# Patient Record
Sex: MALE | Race: WHITE | NOT HISPANIC OR LATINO
[De-identification: names, ages, dates, MRNs, and addresses within clinical notes are randomized per-mention and may not be internally consistent; named-entity substitution may affect disease eponyms.]

---

## 2018-02-22 ENCOUNTER — APPOINTMENT (OUTPATIENT)
Dept: HEMATOLOGY ONCOLOGY | Facility: CLINIC | Age: 73
End: 2018-02-22
Payer: MEDICARE

## 2018-02-22 VITALS
BODY MASS INDEX: 30.4 KG/M2 | RESPIRATION RATE: 20 BRPM | TEMPERATURE: 97.8 F | OXYGEN SATURATION: 98 % | SYSTOLIC BLOOD PRESSURE: 173 MMHG | DIASTOLIC BLOOD PRESSURE: 84 MMHG | WEIGHT: 209.99 LBS | HEART RATE: 61 BPM | HEIGHT: 69.69 IN

## 2018-02-22 DIAGNOSIS — Z82.0 FAMILY HISTORY OF EPILEPSY AND OTHER DISEASES OF THE NERVOUS SYSTEM: ICD-10-CM

## 2018-02-22 DIAGNOSIS — G37.8 OTHER SPECIFIED DEMYELINATING DISEASES OF CENTRAL NERVOUS SYSTEM: ICD-10-CM

## 2018-02-22 DIAGNOSIS — M54.16 RADICULOPATHY, LUMBAR REGION: ICD-10-CM

## 2018-02-22 DIAGNOSIS — Z87.39 PERSONAL HISTORY OF OTHER DISEASES OF THE MUSCULOSKELETAL SYSTEM AND CONNECTIVE TISSUE: ICD-10-CM

## 2018-02-22 DIAGNOSIS — R25.3 FASCICULATION: ICD-10-CM

## 2018-02-22 DIAGNOSIS — A06.9 AMEBIASIS, UNSPECIFIED: ICD-10-CM

## 2018-02-22 DIAGNOSIS — R20.2 PARESTHESIA OF SKIN: ICD-10-CM

## 2018-02-22 PROCEDURE — 99205 OFFICE O/P NEW HI 60 MIN: CPT

## 2018-02-22 RX ORDER — DUTASTERIDE 0.5 MG/1
0.5 CAPSULE, LIQUID FILLED ORAL
Refills: 0 | Status: COMPLETED | COMMUNITY
End: 2018-02-22

## 2018-02-22 RX ORDER — CYCLOBENZAPRINE HYDROCHLORIDE 5 MG/1
5 TABLET, FILM COATED ORAL
Refills: 0 | Status: COMPLETED | COMMUNITY
End: 2018-02-22

## 2018-02-22 RX ORDER — CELECOXIB 200 MG/1
200 CAPSULE ORAL
Refills: 0 | Status: COMPLETED | COMMUNITY
End: 2018-02-22

## 2018-02-22 RX ORDER — ROPINIROLE 1 MG/1
1 TABLET, FILM COATED ORAL
Refills: 0 | Status: COMPLETED | COMMUNITY
End: 2018-02-22

## 2018-02-22 RX ORDER — AZELASTINE HYDROCHLORIDE AND FLUTICASONE PROPIONATE 137; 50 UG/1; UG/1
137-50 SPRAY, METERED NASAL
Refills: 0 | Status: COMPLETED | COMMUNITY
End: 2018-02-22

## 2018-02-22 RX ORDER — MONTELUKAST SODIUM 10 MG/1
10 TABLET, FILM COATED ORAL
Refills: 0 | Status: COMPLETED | COMMUNITY
End: 2018-02-22

## 2018-02-22 RX ORDER — DULOXETINE HYDROCHLORIDE 30 MG/1
30 CAPSULE, DELAYED RELEASE ORAL
Refills: 0 | Status: COMPLETED | COMMUNITY
End: 2018-02-22

## 2018-02-22 RX ORDER — FLUTICASONE PROPIONATE 44 UG/1
44 AEROSOL, METERED RESPIRATORY (INHALATION)
Refills: 0 | Status: COMPLETED | COMMUNITY
End: 2018-02-22

## 2018-02-22 RX ORDER — FLUTICASONE PROPIONATE AND SALMETEROL XINAFOATE 115; 21 UG/1; UG/1
115-21 AEROSOL, METERED RESPIRATORY (INHALATION)
Refills: 0 | Status: COMPLETED | COMMUNITY
End: 2018-02-22

## 2018-02-22 RX ORDER — ALBUTEROL SULFATE 90 UG/1
108 (90 BASE) AEROSOL, METERED RESPIRATORY (INHALATION)
Refills: 0 | Status: COMPLETED | COMMUNITY
End: 2018-02-22

## 2018-03-02 ENCOUNTER — OTHER (OUTPATIENT)
Age: 73
End: 2018-03-02

## 2018-03-08 ENCOUNTER — APPOINTMENT (OUTPATIENT)
Dept: HEMATOLOGY ONCOLOGY | Facility: CLINIC | Age: 73
End: 2018-03-08
Payer: MEDICARE

## 2018-03-08 VITALS
SYSTOLIC BLOOD PRESSURE: 126 MMHG | TEMPERATURE: 98.5 F | HEIGHT: 69.69 IN | BODY MASS INDEX: 30.26 KG/M2 | HEART RATE: 61 BPM | RESPIRATION RATE: 16 BRPM | WEIGHT: 209 LBS | DIASTOLIC BLOOD PRESSURE: 72 MMHG | OXYGEN SATURATION: 98 %

## 2018-03-08 PROCEDURE — 99214 OFFICE O/P EST MOD 30 MIN: CPT | Mod: 25

## 2018-03-08 PROCEDURE — 38222 DX BONE MARROW BX & ASPIR: CPT | Mod: LT

## 2018-03-15 ENCOUNTER — APPOINTMENT (OUTPATIENT)
Dept: HEMATOLOGY ONCOLOGY | Facility: CLINIC | Age: 73
End: 2018-03-15
Payer: MEDICARE

## 2018-03-15 PROCEDURE — 99499A: CUSTOM | Mod: NC

## 2018-03-20 ENCOUNTER — APPOINTMENT (OUTPATIENT)
Dept: HEMATOLOGY ONCOLOGY | Facility: CLINIC | Age: 73
End: 2018-03-20
Payer: MEDICARE

## 2018-03-20 VITALS
SYSTOLIC BLOOD PRESSURE: 143 MMHG | RESPIRATION RATE: 16 BRPM | DIASTOLIC BLOOD PRESSURE: 87 MMHG | HEART RATE: 61 BPM | TEMPERATURE: 97.8 F | BODY MASS INDEX: 30.26 KG/M2 | HEIGHT: 69.69 IN | OXYGEN SATURATION: 98 % | WEIGHT: 209 LBS

## 2018-03-20 PROCEDURE — 99213 OFFICE O/P EST LOW 20 MIN: CPT

## 2018-03-20 RX ORDER — PREGABALIN 50 MG/1
50 CAPSULE ORAL
Refills: 0 | Status: DISCONTINUED | COMMUNITY
End: 2018-03-20

## 2018-03-29 ENCOUNTER — APPOINTMENT (OUTPATIENT)
Dept: HEMATOLOGY ONCOLOGY | Facility: CLINIC | Age: 73
End: 2018-03-29
Payer: MEDICARE

## 2018-03-29 VITALS
OXYGEN SATURATION: 98 % | WEIGHT: 207.98 LBS | HEIGHT: 69.69 IN | HEART RATE: 57 BPM | BODY MASS INDEX: 30.11 KG/M2 | DIASTOLIC BLOOD PRESSURE: 88 MMHG | SYSTOLIC BLOOD PRESSURE: 146 MMHG | TEMPERATURE: 98.5 F | RESPIRATION RATE: 16 BRPM

## 2018-03-29 PROCEDURE — 99214 OFFICE O/P EST MOD 30 MIN: CPT

## 2018-04-05 ENCOUNTER — APPOINTMENT (OUTPATIENT)
Dept: HEMATOLOGY ONCOLOGY | Facility: CLINIC | Age: 73
End: 2018-04-05
Payer: MEDICARE

## 2018-04-05 VITALS
HEIGHT: 69.69 IN | DIASTOLIC BLOOD PRESSURE: 74 MMHG | WEIGHT: 209.99 LBS | TEMPERATURE: 98 F | RESPIRATION RATE: 16 BRPM | OXYGEN SATURATION: 96 % | SYSTOLIC BLOOD PRESSURE: 116 MMHG | BODY MASS INDEX: 30.4 KG/M2 | HEART RATE: 57 BPM

## 2018-04-05 PROCEDURE — 99215 OFFICE O/P EST HI 40 MIN: CPT

## 2018-04-05 RX ORDER — NITROFURANTOIN (MONOHYDRATE/MACROCRYSTALS) 25; 75 MG/1; MG/1
100 CAPSULE ORAL
Qty: 20 | Refills: 0 | Status: COMPLETED | COMMUNITY
Start: 2018-03-23 | End: 2018-04-05

## 2018-04-05 RX ORDER — CEFDINIR 300 MG/1
300 CAPSULE ORAL
Qty: 20 | Refills: 0 | Status: COMPLETED | COMMUNITY
Start: 2017-10-10

## 2018-04-05 RX ORDER — ATOVAQUONE AND PROGUANIL HYDROCHLORIDE 250; 100 MG/1; MG/1
250-100 TABLET, FILM COATED ORAL
Qty: 60 | Refills: 0 | Status: COMPLETED | COMMUNITY
Start: 2017-12-07

## 2018-04-05 RX ORDER — CEFUROXIME AXETIL 500 MG/1
500 TABLET ORAL
Qty: 60 | Refills: 0 | Status: COMPLETED | COMMUNITY
Start: 2017-11-07

## 2018-04-05 RX ORDER — DOXYCYCLINE HYCLATE 100 MG/1
100 CAPSULE ORAL
Qty: 90 | Refills: 0 | Status: COMPLETED | COMMUNITY
Start: 2017-10-10

## 2018-04-05 RX ORDER — AZITHROMYCIN 250 MG/1
250 TABLET, FILM COATED ORAL
Qty: 30 | Refills: 0 | Status: COMPLETED | COMMUNITY
Start: 2017-12-07

## 2018-04-12 ENCOUNTER — APPOINTMENT (OUTPATIENT)
Dept: HEMATOLOGY ONCOLOGY | Facility: CLINIC | Age: 73
End: 2018-04-12
Payer: MEDICARE

## 2018-04-12 VITALS
DIASTOLIC BLOOD PRESSURE: 85 MMHG | HEART RATE: 56 BPM | RESPIRATION RATE: 20 BRPM | WEIGHT: 206 LBS | TEMPERATURE: 98.1 F | SYSTOLIC BLOOD PRESSURE: 123 MMHG | OXYGEN SATURATION: 99 % | HEIGHT: 69.69 IN | BODY MASS INDEX: 29.83 KG/M2

## 2018-04-12 PROCEDURE — 99214 OFFICE O/P EST MOD 30 MIN: CPT

## 2018-04-19 ENCOUNTER — APPOINTMENT (OUTPATIENT)
Dept: HEMATOLOGY ONCOLOGY | Facility: CLINIC | Age: 73
End: 2018-04-19

## 2018-04-26 ENCOUNTER — APPOINTMENT (OUTPATIENT)
Dept: HEMATOLOGY ONCOLOGY | Facility: CLINIC | Age: 73
End: 2018-04-26
Payer: MEDICARE

## 2018-04-26 VITALS
SYSTOLIC BLOOD PRESSURE: 120 MMHG | OXYGEN SATURATION: 98 % | TEMPERATURE: 98.4 F | HEIGHT: 69.69 IN | WEIGHT: 206 LBS | DIASTOLIC BLOOD PRESSURE: 78 MMHG | BODY MASS INDEX: 29.83 KG/M2 | RESPIRATION RATE: 20 BRPM | HEART RATE: 61 BPM

## 2018-04-26 PROCEDURE — 99213 OFFICE O/P EST LOW 20 MIN: CPT

## 2018-05-16 ENCOUNTER — APPOINTMENT (OUTPATIENT)
Dept: INTERNAL MEDICINE | Facility: CLINIC | Age: 73
End: 2018-05-16

## 2018-05-16 VITALS
WEIGHT: 199 LBS | DIASTOLIC BLOOD PRESSURE: 88 MMHG | BODY MASS INDEX: 27.86 KG/M2 | HEIGHT: 71 IN | HEART RATE: 59 BPM | TEMPERATURE: 98.1 F | SYSTOLIC BLOOD PRESSURE: 137 MMHG | OXYGEN SATURATION: 98 %

## 2018-05-16 NOTE — HISTORY OF PRESENT ILLNESS
[FreeTextEntry1] : persistent pain and paraesthesias in extremities. 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 he has trouble sleeping because of this. He is on lyrica without much improvement. he rarely takes ambien. He did find tramadol at night helpful but does not have a rx. He comes asking for a sleeping aide.

## 2018-05-16 NOTE — PLAN
[FreeTextEntry1] : try tramadol hs. hepatitis A vaccine has been given as pt is traveling to the [facility].

## 2018-05-17 ENCOUNTER — APPOINTMENT (OUTPATIENT)
Dept: HEMATOLOGY ONCOLOGY | Facility: CLINIC | Age: 73
End: 2018-05-17
Payer: MEDICARE

## 2018-05-17 VITALS
SYSTOLIC BLOOD PRESSURE: 126 MMHG | TEMPERATURE: 97.5 F | BODY MASS INDEX: 28.14 KG/M2 | RESPIRATION RATE: 20 BRPM | OXYGEN SATURATION: 97 % | WEIGHT: 201 LBS | DIASTOLIC BLOOD PRESSURE: 80 MMHG | HEIGHT: 71.06 IN | HEART RATE: 60 BPM

## 2018-05-17 PROCEDURE — 99214 OFFICE O/P EST MOD 30 MIN: CPT

## 2018-06-14 ENCOUNTER — APPOINTMENT (OUTPATIENT)
Dept: HEMATOLOGY ONCOLOGY | Facility: CLINIC | Age: 73
End: 2018-06-14
Payer: MEDICARE

## 2018-06-14 VITALS
OXYGEN SATURATION: 98 % | BODY MASS INDEX: 27.58 KG/M2 | DIASTOLIC BLOOD PRESSURE: 78 MMHG | TEMPERATURE: 97.6 F | HEART RATE: 53 BPM | HEIGHT: 71.06 IN | SYSTOLIC BLOOD PRESSURE: 130 MMHG | WEIGHT: 196.98 LBS | RESPIRATION RATE: 16 BRPM

## 2018-06-14 PROCEDURE — 99213 OFFICE O/P EST LOW 20 MIN: CPT

## 2018-06-22 ENCOUNTER — APPOINTMENT (OUTPATIENT)
Dept: INTERNAL MEDICINE | Facility: CLINIC | Age: 73
End: 2018-06-22

## 2018-06-22 VITALS
WEIGHT: 193 LBS | SYSTOLIC BLOOD PRESSURE: 130 MMHG | DIASTOLIC BLOOD PRESSURE: 88 MMHG | OXYGEN SATURATION: 97 % | HEIGHT: 71 IN | HEART RATE: 60 BPM | TEMPERATURE: 98.6 F | BODY MASS INDEX: 27.02 KG/M2

## 2018-06-22 DIAGNOSIS — Z01.818 ENCOUNTER FOR OTHER PREPROCEDURAL EXAMINATION: ICD-10-CM

## 2018-06-22 NOTE — HISTORY OF PRESENT ILLNESS
[FreeTextEntry1] : pre op eval for green light laser surgery for bph. [de-identified] : 72 yo male with hx of cipd, on rituxan. Followed by dr Humphries. No hx of htn, dm or cardiac disease. He feels well other than neuropathic pain.

## 2018-06-22 NOTE — ASSESSMENT
[FreeTextEntry1] : labs were reviewed. ekg reveals sinus bradycardia with occ apc's. Pt is medically stable to proceed with planned surgery under general anaesthesia.

## 2018-06-22 NOTE — REVIEW OF SYSTEMS
[Hesitancy] : hesitancy [Nocturia] : nocturia [Frequency] : frequency [Headache] : no headache [Dizziness] : no dizziness [Fainting] : no fainting [Confusion] : no confusion [Unsteady Walk] : no ataxia [Memory Loss] : no memory loss [Negative] : Heme/Lymph [de-identified] : neuropathic pain of legs.

## 2018-06-27 ENCOUNTER — MED ADMIN CHARGE (OUTPATIENT)
Age: 73
End: 2018-06-27

## 2018-07-18 ENCOUNTER — MEDICATION RENEWAL (OUTPATIENT)
Age: 73
End: 2018-07-18

## 2018-07-27 ENCOUNTER — NON-APPOINTMENT (OUTPATIENT)
Age: 73
End: 2018-07-27

## 2018-08-07 ENCOUNTER — APPOINTMENT (OUTPATIENT)
Dept: HEMATOLOGY ONCOLOGY | Facility: CLINIC | Age: 73
End: 2018-08-07
Payer: MEDICARE

## 2018-08-07 VITALS
HEART RATE: 64 BPM | RESPIRATION RATE: 20 BRPM | OXYGEN SATURATION: 98 % | SYSTOLIC BLOOD PRESSURE: 149 MMHG | DIASTOLIC BLOOD PRESSURE: 96 MMHG | TEMPERATURE: 98.1 F | BODY MASS INDEX: 27.02 KG/M2 | WEIGHT: 192.99 LBS | HEIGHT: 71 IN

## 2018-08-07 PROCEDURE — 99214 OFFICE O/P EST MOD 30 MIN: CPT

## 2018-08-07 RX ORDER — TRAMADOL HYDROCHLORIDE 50 MG/1
50 TABLET, COATED ORAL
Qty: 60 | Refills: 0 | Status: DISCONTINUED | COMMUNITY
Start: 2018-05-19 | End: 2018-08-07

## 2018-09-07 ENCOUNTER — APPOINTMENT (OUTPATIENT)
Dept: INTERNAL MEDICINE | Facility: CLINIC | Age: 73
End: 2018-09-07

## 2018-09-20 ENCOUNTER — APPOINTMENT (OUTPATIENT)
Dept: HEMATOLOGY ONCOLOGY | Facility: CLINIC | Age: 73
End: 2018-09-20
Payer: MEDICARE

## 2018-10-01 ENCOUNTER — MEDICATION RENEWAL (OUTPATIENT)
Age: 73
End: 2018-10-01

## 2018-10-03 ENCOUNTER — APPOINTMENT (OUTPATIENT)
Dept: INTERNAL MEDICINE | Facility: CLINIC | Age: 73
End: 2018-10-03

## 2018-10-03 VITALS
HEART RATE: 66 BPM | DIASTOLIC BLOOD PRESSURE: 89 MMHG | TEMPERATURE: 98.2 F | BODY MASS INDEX: 35.87 KG/M2 | OXYGEN SATURATION: 98 % | SYSTOLIC BLOOD PRESSURE: 143 MMHG | WEIGHT: 190 LBS | HEIGHT: 61 IN

## 2018-10-03 DIAGNOSIS — F41.9 ANXIETY DISORDER, UNSPECIFIED: ICD-10-CM

## 2018-10-03 NOTE — HISTORY OF PRESENT ILLNESS
[FreeTextEntry1] : Patient for evaluation of hypertension. He also complains of increased anxiety [de-identified] : Patient states that his blood pressure has been up and down of late. He recently had a colonoscopy and his blood pressure was as high as 180/100. His blood pressure is normally 120/70. He does admit to feeling overly anxious and angry at times. This been going on for at least several months. He is currently maintained on gabapentin 300 b.i.d. for neuropathy.

## 2018-10-03 NOTE — ASSESSMENT
[FreeTextEntry1] : Current blood pressure is 120-130/70. We'll not start antihypertensives. In terms of anxiety and anger I feel we can try Zoloft 50 mg daily. Patient is to call me in one month.

## 2018-10-04 ENCOUNTER — RESULT REVIEW (OUTPATIENT)
Age: 73
End: 2018-10-04

## 2018-10-04 ENCOUNTER — APPOINTMENT (OUTPATIENT)
Dept: HEMATOLOGY ONCOLOGY | Facility: CLINIC | Age: 73
End: 2018-10-04
Payer: MEDICARE

## 2018-10-04 VITALS
HEART RATE: 60 BPM | SYSTOLIC BLOOD PRESSURE: 119 MMHG | TEMPERATURE: 98 F | RESPIRATION RATE: 16 BRPM | HEIGHT: 61.02 IN | OXYGEN SATURATION: 97 % | WEIGHT: 192 LBS | DIASTOLIC BLOOD PRESSURE: 76 MMHG | BODY MASS INDEX: 36.25 KG/M2

## 2018-10-04 PROCEDURE — 99214 OFFICE O/P EST MOD 30 MIN: CPT

## 2018-10-04 RX ORDER — MEFLOQUINE HYDROCHLORIDE 250 MG/1
250 TABLET ORAL
Qty: 9 | Refills: 0 | Status: COMPLETED | COMMUNITY
Start: 2018-07-18 | End: 2018-10-04

## 2018-10-04 RX ORDER — NITROFURANTOIN MACROCRYSTAL 100 MG
CAPSULE ORAL
Refills: 0 | Status: COMPLETED | COMMUNITY
End: 2018-10-04

## 2018-10-04 RX ORDER — NORTRIPTYLINE HYDROCHLORIDE 10 MG/1
10 CAPSULE ORAL
Qty: 30 | Refills: 0 | Status: COMPLETED | COMMUNITY
Start: 2018-08-02 | End: 2018-10-04

## 2018-10-04 RX ORDER — LINEZOLID 600 MG/1
600 TABLET, FILM COATED ORAL
Qty: 20 | Refills: 0 | Status: COMPLETED | COMMUNITY
Start: 2018-08-18 | End: 2018-10-04

## 2018-10-04 RX ORDER — ZOLPIDEM TARTRATE 10 MG/1
10 TABLET ORAL
Qty: 30 | Refills: 0 | Status: DISCONTINUED | COMMUNITY
Start: 2018-04-02 | End: 2018-10-04

## 2018-10-04 RX ORDER — SCOPOLAMINE 1.5 MG/1
1 PATCH, EXTENDED RELEASE TRANSDERMAL
Qty: 6 | Refills: 0 | Status: COMPLETED | COMMUNITY
Start: 2018-07-18 | End: 2018-10-04

## 2018-10-04 RX ORDER — PREDNISONE 10 MG/1
10 TABLET ORAL
Qty: 135 | Refills: 0 | Status: COMPLETED | COMMUNITY
Start: 2018-05-09 | End: 2018-10-04

## 2018-10-04 RX ORDER — HYOSCYAMINE SULFATE 0.12 MG/1
0.12 TABLET, ORALLY DISINTEGRATING ORAL
Qty: 30 | Refills: 0 | Status: COMPLETED | COMMUNITY
Start: 2018-08-18 | End: 2018-10-04

## 2018-10-04 RX ORDER — ALFUZOSIN HYDROCHLORIDE 10 MG/1
10 TABLET, EXTENDED RELEASE ORAL
Qty: 90 | Refills: 0 | Status: COMPLETED | COMMUNITY
Start: 2018-04-11 | End: 2018-10-04

## 2018-10-04 RX ORDER — ATOVAQUONE AND PROGUANIL HYDROCHLORIDE 250; 100 MG/1; MG/1
250-100 TABLET, FILM COATED ORAL DAILY
Qty: 25 | Refills: 0 | Status: COMPLETED | COMMUNITY
Start: 2018-07-18 | End: 2018-10-04

## 2018-10-04 RX ORDER — CIPROFLOXACIN HYDROCHLORIDE 500 MG/1
500 TABLET, FILM COATED ORAL
Refills: 0 | Status: COMPLETED | COMMUNITY
End: 2018-10-04

## 2018-10-15 ENCOUNTER — MEDICATION RENEWAL (OUTPATIENT)
Age: 73
End: 2018-10-15

## 2018-10-29 ENCOUNTER — RESULT REVIEW (OUTPATIENT)
Age: 73
End: 2018-10-29

## 2018-10-29 ENCOUNTER — APPOINTMENT (OUTPATIENT)
Dept: HEMATOLOGY ONCOLOGY | Facility: CLINIC | Age: 73
End: 2018-10-29
Payer: MEDICARE

## 2018-10-29 VITALS
OXYGEN SATURATION: 97 % | WEIGHT: 198 LBS | HEART RATE: 59 BPM | DIASTOLIC BLOOD PRESSURE: 73 MMHG | HEIGHT: 61.02 IN | TEMPERATURE: 98.3 F | BODY MASS INDEX: 37.38 KG/M2 | SYSTOLIC BLOOD PRESSURE: 129 MMHG | RESPIRATION RATE: 20 BRPM

## 2018-10-29 PROCEDURE — 99214 OFFICE O/P EST MOD 30 MIN: CPT

## 2018-10-29 RX ORDER — SERTRALINE HYDROCHLORIDE 50 MG/1
50 TABLET, FILM COATED ORAL DAILY
Qty: 30 | Refills: 2 | Status: COMPLETED | COMMUNITY
Start: 2018-10-03 | End: 2018-10-29

## 2018-11-08 ENCOUNTER — RESULT REVIEW (OUTPATIENT)
Age: 73
End: 2018-11-08

## 2019-01-14 ENCOUNTER — APPOINTMENT (OUTPATIENT)
Dept: UROLOGY | Facility: CLINIC | Age: 74
End: 2019-01-14
Payer: MEDICARE

## 2019-01-14 ENCOUNTER — RESULT REVIEW (OUTPATIENT)
Age: 74
End: 2019-01-14

## 2019-01-14 VITALS
WEIGHT: 195 LBS | DIASTOLIC BLOOD PRESSURE: 92 MMHG | BODY MASS INDEX: 27.3 KG/M2 | TEMPERATURE: 59 F | HEIGHT: 71 IN | SYSTOLIC BLOOD PRESSURE: 146 MMHG

## 2019-01-14 DIAGNOSIS — Z78.9 OTHER SPECIFIED HEALTH STATUS: ICD-10-CM

## 2019-01-14 DIAGNOSIS — R35.0 FREQUENCY OF MICTURITION: ICD-10-CM

## 2019-01-14 DIAGNOSIS — Z98.890 OTHER SPECIFIED POSTPROCEDURAL STATES: ICD-10-CM

## 2019-01-14 DIAGNOSIS — R39.15 URGENCY OF URINATION: ICD-10-CM

## 2019-01-14 DIAGNOSIS — N39.0 URINARY TRACT INFECTION, SITE NOT SPECIFIED: ICD-10-CM

## 2019-01-14 DIAGNOSIS — R39.198 OTHER DIFFICULTIES WITH MICTURITION: ICD-10-CM

## 2019-01-14 LAB
BACTERIA: 0
BILIRUB UR QL STRIP: NORMAL
CASTS: 0
CLARITY UR: CLEAR
COLLECTION METHOD: NORMAL
CRYSTALS: 0
EPITHELIAL CELLS: 0
GLUCOSE UR-MCNC: NORMAL
HCG UR QL: NORMAL EU/DL
HGB UR QL STRIP.AUTO: NORMAL
KETONES UR-MCNC: NORMAL
LEUKOCYTE ESTERASE UR QL STRIP: NORMAL
MUCUS: 0
NITRITE UR QL STRIP: NORMAL
PH UR STRIP: 5
PROT UR STRIP-MCNC: NORMAL
RBC CASTS # UR COMP ASSIST: NORMAL
SP GR UR STRIP: 1.02
WBC: NORMAL

## 2019-01-14 PROCEDURE — 76856 US EXAM PELVIC COMPLETE: CPT

## 2019-01-14 PROCEDURE — 99214 OFFICE O/P EST MOD 30 MIN: CPT | Mod: 25

## 2019-01-14 PROCEDURE — 51741 ELECTRO-UROFLOWMETRY FIRST: CPT

## 2019-01-14 PROCEDURE — 81000 URINALYSIS NONAUTO W/SCOPE: CPT

## 2019-01-14 RX ORDER — MUPIROCIN CALCIUM 20 MG/G
2 OINTMENT TOPICAL
Qty: 1 | Refills: 2 | Status: DISCONTINUED | COMMUNITY
Start: 2018-10-15 | End: 2019-01-14

## 2019-01-14 NOTE — HISTORY OF PRESENT ILLNESS
[Urinary Urgency] : urinary urgency [Urinary Frequency] : urinary frequency [Nocturia] : nocturia [Weak Stream] : weak stream [FreeTextEntry1] : Chilango Aguilar is  a 72yo man now 6 months s/p TULAP.  Surgery was performed in the face of urinary retention, the consequence of a long history of BPH/LUTS complicated by recurring prostatitis and highly labile PSA values.  The patient's 4K Score when last seen 11/08/18 was 4%.  PSA was 3.5.  On pelvic U/S prostatic volume 33.07cc, and the patient was emptying his bladder with a good stream. \par \par  Mr. Aguilar was taking immunosuppressive agents to manage CIDP with anti-MAG.  Persistent pyuria (TULAP had been done 06//27/18) was therefore of concern especially given this gentleman's history of highly resistant UTI's.  He appeared to be doing well at the time.\par \par For the last month and a half Mr. Aguilar has been waking 4-5 x/night (had been 1-2; last night he woke once after taking 5mg of Ambien), and 8-9 x/day (had been 4-5x/d).  The stream had been mediocre, though in the past 2 days frequency has diminished and the stream has improved.  Mr. Aguilar has been taking various cold medicines for the past two weeks.  He took Sudafed last PM.  Complex uroflowmetry today suggests the stream while not strong was rapid.  UA today is positive for only minimal pyuria.  [Urinary Incontinence] : no urinary incontinence [Urinary Retention] : no urinary retention [Straining] : no straining [Erectile Dysfunction] : no Erectile Dysfunction [Dysuria] : no dysuria [Hematuria - Gross] : no gross hematuria [Abdominal Pain] : no abdominal pain [Flank Pain] : no flank pain [Edema] : ~T edema was not present [Weight Loss] : no recent ~M [unfilled] weight loss [Fever] : no fever [Fatigue] : no fatigue [Nausea] : no nausea [Anorexia] : no anorexia

## 2019-01-14 NOTE — CHIEF COMPLAINT
[FreeTextEntry1] : FREQUENCY AND URGENCY problematic for the past 1.5 months (actually better for the past 2 days)

## 2019-01-14 NOTE — ASSESSMENT
[FreeTextEntry1] : Chilango Aguilar is a 73-year-old male approximately 6 months status post TULAP. The patient voided today with a stream that proved to be rapid (average flow 17% above the median deviation) but not as strong as it should have been for the volume voided. Irritative voiding symptoms present for the last month and a half appear to have improved greatly over the last 2 days and possibly last 2 weeks secondary to medications taken for an upper respiratory tract infection. The absence of significant postvoid residual or significant regrowth of the prostate gland since last measured at November, with a benign feeling prostate and minimal pyuria on urinalysis suggest the patient's symptoms may be neuropathic in origin. PM, Benadryl, 25-50 mg, has been advised pending urine culture results. A positive culture would warrant further investigation with cystoscopy to rule out the presence of scarring or obstructing calcifications within the prostatic fossa as suggested by ultrasonography today.  Followup in 6-8 weeks is planned. No other intervention appears to be necessary at this time. The patient has been advised that should his symptoms recur he should call his office for further advice.

## 2019-01-14 NOTE — PHYSICAL EXAM
[General Appearance - Well Nourished] : well nourished [Normal Appearance] : normal appearance [Well Groomed] : well groomed [General Appearance - In No Acute Distress] : no acute distress [Bowel Sounds] : normal bowel sounds [Abdomen Soft] : soft [Abdomen Tenderness] : non-tender [Abdomen Mass (___ Cm)] : no abdominal mass palpated [Abdomen Hernia] : no hernia was discovered [Costovertebral Angle Tenderness] : no ~M costovertebral angle tenderness [Size (2+)] : size was 2+ [Rectal Exam - Seminal Vesicles] : was normal [Nl Sphincter Tone] : normal sphincter tone [No Lesions] : no lesions [Circumcised] : the penis was circumcised [Normal] : normal [Testes] : normal [Epididymis] : was normal [Vas Deferens / Spermatic Cord] : was normal [Skin Lesions] : no skin lesions [5th Left ICS - MCL] : palpated at the 5th LICS in the midclavicular line [Normal Rate] : normal [Normal S1] : normal S1 [Normal S2] : normal S2 [No Murmur] : no murmurs heard [I] : a grade 1 [No Pitting Edema] : no pitting edema present [] : no respiratory distress [Respiration, Rhythm And Depth] : normal respiratory rhythm and effort [Exaggerated Use Of Accessory Muscles For Inspiration] : no accessory muscle use [Auscultation Breath Sounds / Voice Sounds] : lungs were clear to auscultation bilaterally [Chest Palpation] : palpation of the chest revealed no abnormalities [Lungs Percussion] : the lungs were normal to percussion [Oriented To Time, Place, And Person] : oriented to person, place, and time [Affect] : the affect was normal [Mood] : the mood was normal [Not Anxious] : not anxious [No Palpable Adenopathy] : no palpable adenopathy [Prostate Hard Area Or Nodule Bilaterally] : had no palpable nodules [Prostate Tenderness] : was not tender [Prostate Fluctuant] : was not fluctuant [Occult Blood Positive] : exam was negative for occult blood [Rectal Tenderness] : no tenderness [Mass___cm] : no rectal masses [S3] : no S3 [S4] : no S4 [Rt] : no varicose veins of the right leg [Lt] : no varicose veins of the left leg [FreeTextEntry1] : (see ROS)

## 2019-01-14 NOTE — REVIEW OF SYSTEMS
[Feeling Poorly] : feeling poorly [Loss Of Hearing] : hearing loss [Arthralgias] : arthralgias [Limb Pain] : limb pain [No Sensation] : anesthesia [Tingling] : tingling [Burning Sensation] : a burning sensation [Hyperesthesia] : hyperesthesia [Difficulty Writing] : difficulty writing [Numbness] : numbness [Negative] : Heme/Lymph [Fever] : no fever [Chills] : no chills [Feeling Tired] : not feeling tired [Recent Weight Gain (___ Lbs)] : no recent weight gain [Recent Weight Loss (___ Lbs)] : no recent weight loss [Eyesight Problems] : no eyesight problems [Nosebleeds] : no nosebleeds [Chest Pain] : no chest pain [Palpitations] : no palpitations [Leg Claudication] : no intermittent leg claudication [Lower Ext Edema] : no extremity edema [Shortness Of Breath] : no shortness of breath [Cough] : no cough [Orthopnea] : no orthopnea [Wheezing] : no wheezing [SOB on Exertion] : no shortness of breath during exertion [PND] : no PND [Joint Pain] : no joint pain [Skin Lesions] : no skin lesions [Skin Wound] : no skin wound [Itching] : no itching [Change In A Mole] : no change in a mole [Dry Skin] : no dry skin [An Unusual Growth] : no unusual growth on the skin [Arm Weakness] : no arm weakness [Hand Weakness] : no hand weakness [Poor Coordination] : good coordination [Frequent Falls] : not falling [Hot Flashes] : no hot flashes [Muscle Weakness] : no muscle weakness [Erectile Dysfunction] : no erectile dysfunction [FreeTextEntry2] : HAND TREMORS

## 2019-01-24 NOTE — PROCEDURE
[Bone Marrow Biopsy] : bone marrow biopsy [Bone Marrow Aspiration] : bone marrow aspiration  [Patient] : the patient [Verbal Consent Obtained] : verbal consent was obtained prior to the procedure [Patient identification verified] : patient identification verified [Procedure verified and consent obtained] : procedure verified and consent obtained [Laterality verified and correct site marked] : laterality verified and correct site marked [Left] : site: left [Correct positioning] : correct positioning [Correct implant and/ or special equipment obtained] : correct impact and/ or special equipment obtained [Prone] : prone [Superior iliac spine was identified] : the superior iliac spine was identified. [The left posterior iliac crest was prepped with betadine and draped, using sterile technique.] : The left posterior iliac crest was prepped with betadine and draped, using sterile technique. [Lidocaine was injected and into the periosteum overlying the site.] : Lidocaine was injected and into the periosteum overlying the site. [Aspirate] : aspirate [Cytogenetics] : cytogenetics [FISH] : FISH [Biopsy] : biopsy [Flow Cytometry] : flow cytometry [] : The patient was instructed to remove the bandage the following AM. The patient may bathe. Acetaminophen may be taken for discomfort, as per package directions.If there are any other problems, the patient was instructed to call the office. The patient verbalized understanding, and is aware of the office contact numbers.

## 2019-01-31 ENCOUNTER — RESULT REVIEW (OUTPATIENT)
Age: 74
End: 2019-01-31

## 2019-01-31 ENCOUNTER — APPOINTMENT (OUTPATIENT)
Dept: HEMATOLOGY ONCOLOGY | Facility: CLINIC | Age: 74
End: 2019-01-31
Payer: MEDICARE

## 2019-01-31 VITALS
TEMPERATURE: 98 F | OXYGEN SATURATION: 97 % | HEART RATE: 59 BPM | HEIGHT: 71 IN | SYSTOLIC BLOOD PRESSURE: 152 MMHG | DIASTOLIC BLOOD PRESSURE: 87 MMHG | WEIGHT: 210 LBS | BODY MASS INDEX: 29.4 KG/M2 | RESPIRATION RATE: 18 BRPM

## 2019-01-31 PROCEDURE — 99214 OFFICE O/P EST MOD 30 MIN: CPT

## 2019-01-31 NOTE — ASSESSMENT
[FreeTextEntry1] : 72 active man referred by Dr. 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 Anti MAG,IgM\par - RTC May for Rituximab \par \par \par \par

## 2019-01-31 NOTE — HISTORY OF PRESENT ILLNESS
[Disease:__________________________] : Disease: [unfilled] [de-identified] : Patient is a 72 year old male referred by Dr. 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 Labs dated  01/12/2018 show Gamma globulin fraction: 2.2, M spike unable to quantitate but a weak gamma migrating paraprotein identified. Immunofixation identified IgM Kappa band.  Also noted ALVARO 1:160 IFA pattern: speckled.  Patient reports having tingling sensation in hands, feet, arms, legs, and right side face. Patient reports having fatigue.  \par He was diagnosed with CIPD with anti-MAG antibody couple of years ago and has progression. He complains of paresthesias in legs up to knee level and arms.  He feels his balance is affected.  He was treated with IVIG pulse treatment and there was improvement in the reflexes but not with paresthesias.  [de-identified] : Mr. Aguilar presents today for followup for IgM monoclonal gammopathy and neuropathic pain in feet and legs, paresthesias of right side of face and neck. He saw Dr. De Luna , neurology, at [facility] in November, currently on Neurontin.  He states nothing came about from the visit.  He denies fever, bleeding, pain, bruising or rash.  He gets medical marijuana from the dispensary from [facility], uses it occasionally.

## 2019-01-31 NOTE — REVIEW OF SYSTEMS
[Negative] : Allergic/Immunologic [Fever] : no fever [Fatigue] : no fatigue [Recent Change In Weight] : ~T no recent weight change [Eye Pain] : no eye pain [Vision Problems] : no vision problems [Nosebleeds] : no nosebleeds [Hoarseness] : no hoarseness [Mucosal Pain] : no mucosal pain [Chest Pain] : no chest pain [Lower Ext Edema] : no lower extremity edema [Shortness Of Breath] : no shortness of breath [Cough] : no cough [Abdominal Pain] : no abdominal pain [Constipation] : no constipation [Diarrhea] : no diarrhea [Dysuria] : no dysuria [Joint Stiffness] : no joint stiffness [Insomnia] : no insomnia [Anxiety] : no anxiety [Depression] : no depression [Hot Flashes] : no hot flashes [FreeTextEntry2] : - 1 lb [de-identified] : progressive paraesthesia in legs hands feel and tingling in right side of face/ neuropathic pain-on Neurontin

## 2019-01-31 NOTE — PHYSICAL EXAM
[Fully active, able to carry on all pre-disease performance without restriction] : Status 0 - Fully active, able to carry on all pre-disease performance without restriction [Normal] : affect appropriate [de-identified] : decreased sensation LE and hands

## 2019-01-31 NOTE — RESULTS/DATA
[FreeTextEntry1] : August 7, 2018 WBC 6.2 hemoglobin 14.5 hematocrit 43.3 platelets 190,000\par June 14, 2018 IgM 112 (194) MAG antibody negative

## 2019-01-31 NOTE — CONSULT LETTER
[Dear  ___] : Dear  [unfilled], [Consult Letter:] : I had the pleasure of evaluating your patient, [unfilled]. [Please see my note below.] : Please see my note below. [Consult Closing:] : Thank you very much for allowing me to participate in the care of this patient.  If you have any questions, please do not hesitate to contact me. [Sincerely,] : Sincerely, [FreeTextEntry3] : Angy Quijano MD\par [facility] [place] [facility]\par

## 2019-02-06 LAB — BACTERIA UR CULT: NORMAL

## 2019-02-16 DIAGNOSIS — R74.8 ABNORMAL LEVELS OF OTHER SERUM ENZYMES: ICD-10-CM

## 2019-02-16 DIAGNOSIS — Z85.79 PERSONAL HISTORY OF OTHER MALIGNANT NEOPLASMS OF LYMPHOID, HEMATOPOIETIC AND RELATED TISSUES: ICD-10-CM

## 2019-02-25 PROBLEM — R74.8 ELEVATED CK: Status: ACTIVE | Noted: 2019-02-25

## 2019-02-25 PROBLEM — Z85.79 HISTORY OF WALDENSTROM'S MACROGLOBULINEMIA: Status: RESOLVED | Noted: 2019-02-25 | Resolved: 2019-02-25

## 2019-03-18 ENCOUNTER — APPOINTMENT (OUTPATIENT)
Dept: UROLOGY | Facility: CLINIC | Age: 74
End: 2019-03-18

## 2019-03-19 ENCOUNTER — MEDICATION RENEWAL (OUTPATIENT)
Age: 74
End: 2019-03-19

## 2019-03-20 ENCOUNTER — OTHER (OUTPATIENT)
Age: 74
End: 2019-03-20

## 2019-05-02 NOTE — PROCEDURE
[Bone Marrow Aspiration] : bone marrow aspiration  [Bone Marrow Biopsy] : bone marrow biopsy [Verbal Consent Obtained] : verbal consent was obtained prior to the procedure [Patient] : the patient [Patient identification verified] : patient identification verified [Laterality verified and correct site marked] : laterality verified and correct site marked [Left] : site: left [Procedure verified and consent obtained] : procedure verified and consent obtained [Prone] : prone [Correct positioning] : correct positioning [Correct implant and/ or special equipment obtained] : correct impact and/ or special equipment obtained [The left posterior iliac crest was prepped with betadine and draped, using sterile technique.] : The left posterior iliac crest was prepped with betadine and draped, using sterile technique. [Superior iliac spine was identified] : the superior iliac spine was identified. [Lidocaine was injected and into the periosteum overlying the site.] : Lidocaine was injected and into the periosteum overlying the site. [Aspirate] : aspirate [Cytogenetics] : cytogenetics [FISH] : FISH [] : The patient was instructed to remove the bandage the following AM. The patient may bathe. Acetaminophen may be taken for discomfort, as per package directions.If there are any other problems, the patient was instructed to call the office. The patient verbalized understanding, and is aware of the office contact numbers. [Flow Cytometry] : flow cytometry [Biopsy] : biopsy

## 2019-05-09 ENCOUNTER — RESULT REVIEW (OUTPATIENT)
Age: 74
End: 2019-05-09

## 2019-05-09 ENCOUNTER — APPOINTMENT (OUTPATIENT)
Dept: HEMATOLOGY ONCOLOGY | Facility: CLINIC | Age: 74
End: 2019-05-09
Payer: MEDICARE

## 2019-05-09 VITALS
BODY MASS INDEX: 29.39 KG/M2 | TEMPERATURE: 97.8 F | HEART RATE: 64 BPM | WEIGHT: 203 LBS | DIASTOLIC BLOOD PRESSURE: 80 MMHG | OXYGEN SATURATION: 98 % | RESPIRATION RATE: 18 BRPM | SYSTOLIC BLOOD PRESSURE: 159 MMHG | HEIGHT: 69.69 IN

## 2019-05-09 PROCEDURE — 99214 OFFICE O/P EST MOD 30 MIN: CPT

## 2019-05-09 NOTE — REVIEW OF SYSTEMS
[Negative] : Psychiatric [Fatigue] : fatigue [Fever] : no fever [Recent Change In Weight] : ~T no recent weight change [Eye Pain] : no eye pain [Vision Problems] : no vision problems [Hoarseness] : no hoarseness [Nosebleeds] : no nosebleeds [Mucosal Pain] : no mucosal pain [Chest Pain] : no chest pain [Lower Ext Edema] : no lower extremity edema [Shortness Of Breath] : no shortness of breath [Abdominal Pain] : no abdominal pain [Cough] : no cough [Diarrhea] : no diarrhea [Constipation] : no constipation [Joint Stiffness] : no joint stiffness [Dysuria] : no dysuria [Insomnia] : no insomnia [Anxiety] : no anxiety [Depression] : no depression [Hot Flashes] : no hot flashes [FreeTextEntry2] : - 1 lb [de-identified] : progressive paraesthesia in legs hands feel and tingling in right side of face/ neuropathic pain-on Neurontin

## 2019-05-09 NOTE — PHYSICAL EXAM
[Fully active, able to carry on all pre-disease performance without restriction] : Status 0 - Fully active, able to carry on all pre-disease performance without restriction [Normal] : normal appearance, no rash, nodules, vesicles, ulcers, erythema [de-identified] : decreased sensation LE and hands

## 2019-05-09 NOTE — CONSULT LETTER
[Dear  ___] : Dear  [unfilled], [Consult Letter:] : I had the pleasure of evaluating your patient, [unfilled]. [Consult Closing:] : Thank you very much for allowing me to participate in the care of this patient.  If you have any questions, please do not hesitate to contact me. [Please see my note below.] : Please see my note below. [Sincerely,] : Sincerely, [FreeTextEntry3] : Angy Quijano MD\par [facility] [place] at [facility]\par

## 2019-05-09 NOTE — ASSESSMENT
[FreeTextEntry1] : 72 active man referred by Dr. 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 Anti MAG,IgM\par - RTC May for Rituximab \par \par \par \par

## 2019-05-09 NOTE — HISTORY OF PRESENT ILLNESS
[Disease:__________________________] : Disease: [unfilled] [de-identified] : Patient is a 72 year old male referred by Dr. 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 Labs dated  01/12/2018 show Gamma globulin fraction: 2.2, M spike unable to quantitate but a weak gamma migrating paraprotein identified. Immunofixation identified IgM Kappa band.  Also noted ALVARO 1:160 IFA pattern: speckled.  Patient reports having tingling sensation in hands, feet, arms, legs, and right side face. Patient reports having fatigue.  \par He was diagnosed with CIPD with anti-MAG antibody couple of years ago and has progression. He complains of paresthesias in legs up to knee level and arms.  He feels his balance is affected.  He was treated with IVIG pulse treatment and there was improvement in the reflexes but not with paresthesias.  [de-identified] : Mr. Aguilar presents today for followup for IgM monoclonal gammopathy and neuropathic pain in feet and legs, paresthesias of right side of face and neck.  Remains on a Prednisone taper.

## 2019-05-15 ENCOUNTER — OTHER (OUTPATIENT)
Age: 74
End: 2019-05-15

## 2019-05-16 ENCOUNTER — APPOINTMENT (OUTPATIENT)
Dept: GASTROENTEROLOGY | Facility: CLINIC | Age: 74
End: 2019-05-16
Payer: MEDICARE

## 2019-05-16 VITALS
DIASTOLIC BLOOD PRESSURE: 90 MMHG | WEIGHT: 214 LBS | BODY MASS INDEX: 30.98 KG/M2 | HEART RATE: 63 BPM | SYSTOLIC BLOOD PRESSURE: 150 MMHG | HEIGHT: 69.69 IN

## 2019-05-16 DIAGNOSIS — R10.13 EPIGASTRIC PAIN: ICD-10-CM

## 2019-05-16 PROCEDURE — 99214 OFFICE O/P EST MOD 30 MIN: CPT

## 2019-05-16 RX ORDER — IBUPROFEN 600 MG/1
600 TABLET, FILM COATED ORAL
Qty: 15 | Refills: 0 | Status: DISCONTINUED | COMMUNITY
Start: 2018-08-18 | End: 2019-05-16

## 2019-05-16 RX ORDER — TRAMADOL HYDROCHLORIDE 50 MG/1
50 TABLET, COATED ORAL
Qty: 60 | Refills: 2 | Status: DISCONTINUED | COMMUNITY
Start: 2019-03-19 | End: 2019-05-16

## 2019-05-16 RX ORDER — PREDNISONE 50 MG/1
TABLET ORAL
Refills: 0 | Status: DISCONTINUED | COMMUNITY
End: 2019-05-16

## 2019-05-16 NOTE — ASSESSMENT
[FreeTextEntry1] : Will f/u stool studies as previously ordered (he brings these in today).\par \par Agree with continuing omeprazole 20mg daily for at least 2 weeks.\par \par If no improvement on omeprazole, and no abnormal results from stool studies, would proceed to an EGD.

## 2019-05-16 NOTE — PHYSICAL EXAM
[General Appearance - Alert] : alert [General Appearance - In No Acute Distress] : in no acute distress [Sclera] : the sclera and conjunctiva were normal [PERRL With Normal Accommodation] : pupils were equal in size, round, and reactive to light [Extraocular Movements] : extraocular movements were intact [Outer Ear] : the ears and nose were normal in appearance [Oropharynx] : the oropharynx was normal [Neck Appearance] : the appearance of the neck was normal [Neck Cervical Mass (___cm)] : no neck mass was observed [Jugular Venous Distention Increased] : there was no jugular-venous distention [Thyroid Diffuse Enlargement] : the thyroid was not enlarged [Thyroid Nodule] : there were no palpable thyroid nodules [Auscultation Breath Sounds / Voice Sounds] : lungs were clear to auscultation bilaterally [Heart Rate And Rhythm] : heart rate was normal and rhythm regular [Heart Sounds] : normal S1 and S2 [Heart Sounds Gallop] : no gallops [Murmurs] : no murmurs [Heart Sounds Pericardial Friction Rub] : no pericardial rub [Full Pulse] : the pedal pulses are present [Edema] : there was no peripheral edema [Bowel Sounds] : normal bowel sounds [Abdomen Soft] : soft [Abdomen Tenderness] : non-tender [Abdomen Mass (___ Cm)] : no abdominal mass palpated [No CVA Tenderness] : no ~M costovertebral angle tenderness [Abnormal Walk] : normal gait [No Spinal Tenderness] : no spinal tenderness [Nail Clubbing] : no clubbing  or cyanosis of the fingernails [Musculoskeletal - Swelling] : no joint swelling seen [Motor Tone] : muscle strength and tone were normal [Skin Color & Pigmentation] : normal skin color and pigmentation [Skin Turgor] : normal skin turgor [] : no rash [Deep Tendon Reflexes (DTR)] : deep tendon reflexes were 2+ and symmetric [No Focal Deficits] : no focal deficits [Sensation] : the sensory exam was normal to light touch and pinprick [Affect] : the affect was normal [Oriented To Time, Place, And Person] : oriented to person, place, and time [Impaired Insight] : insight and judgment were intact

## 2019-05-16 NOTE — HISTORY OF PRESENT ILLNESS
[FreeTextEntry1] : Ms. Aguialr is a pleasant 74M h/o MGUS, Waldenstrom's macroglubulinemia, prostatitis, CIDP who returns for f/u today.  States that he developed burning epigastric/mid abdominal discomfort over the past few weeks, non-radiating, no clear exacerbating factors.  No N/V, still having a formed brown BM daily, however is seeing mildly narrower stool than previously.  No fevers or chills.  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 Sigmoid diverticulosis\par 2. Internal hemorrhoids\par \par Has never had an EGD.

## 2019-05-23 ENCOUNTER — RESULT REVIEW (OUTPATIENT)
Age: 74
End: 2019-05-23

## 2019-05-24 ENCOUNTER — MEDICATION RENEWAL (OUTPATIENT)
Age: 74
End: 2019-05-24

## 2019-05-24 ENCOUNTER — RX RENEWAL (OUTPATIENT)
Age: 74
End: 2019-05-24

## 2019-05-24 LAB
BACTERIA STL CULT: NORMAL
C DIFF TOX GENS STL QL NAA+PROBE: NORMAL
CDIFF BY PCR: NOT DETECTED
DEPRECATED O AND P PREP STL: NORMAL
G LAMBLIA AG STL QL: NORMAL

## 2019-07-09 NOTE — PROCEDURE
[Bone Marrow Biopsy] : bone marrow biopsy [Verbal Consent Obtained] : verbal consent was obtained prior to the procedure [Bone Marrow Aspiration] : bone marrow aspiration  [Patient] : the patient [Patient identification verified] : patient identification verified [Procedure verified and consent obtained] : procedure verified and consent obtained [Laterality verified and correct site marked] : laterality verified and correct site marked [Left] : site: left [Correct implant and/ or special equipment obtained] : correct impact and/ or special equipment obtained [Correct positioning] : correct positioning [Prone] : prone [Superior iliac spine was identified] : the superior iliac spine was identified. [The left posterior iliac crest was prepped with betadine and draped, using sterile technique.] : The left posterior iliac crest was prepped with betadine and draped, using sterile technique. [Lidocaine was injected and into the periosteum overlying the site.] : Lidocaine was injected and into the periosteum overlying the site. [Aspirate] : aspirate [Biopsy] : biopsy [FISH] : FISH [Cytogenetics] : cytogenetics [Flow Cytometry] : flow cytometry [] : The patient was instructed to remove the bandage the following AM. The patient may bathe. Acetaminophen may be taken for discomfort, as per package directions.If there are any other problems, the patient was instructed to call the office. The patient verbalized understanding, and is aware of the office contact numbers.

## 2019-07-16 ENCOUNTER — RESULT REVIEW (OUTPATIENT)
Age: 74
End: 2019-07-16

## 2019-07-16 ENCOUNTER — APPOINTMENT (OUTPATIENT)
Dept: HEMATOLOGY ONCOLOGY | Facility: CLINIC | Age: 74
End: 2019-07-16
Payer: MEDICARE

## 2019-07-16 VITALS
WEIGHT: 203 LBS | SYSTOLIC BLOOD PRESSURE: 121 MMHG | RESPIRATION RATE: 18 BRPM | HEART RATE: 64 BPM | OXYGEN SATURATION: 98 % | BODY MASS INDEX: 29.39 KG/M2 | DIASTOLIC BLOOD PRESSURE: 84 MMHG | TEMPERATURE: 98.3 F | HEIGHT: 69.69 IN

## 2019-07-16 PROCEDURE — 99214 OFFICE O/P EST MOD 30 MIN: CPT

## 2019-07-16 RX ORDER — ZOLPIDEM TARTRATE 10 MG/1
10 TABLET ORAL
Qty: 30 | Refills: 3 | Status: COMPLETED | COMMUNITY
Start: 2019-05-24 | End: 2019-07-16

## 2019-07-16 NOTE — HISTORY OF PRESENT ILLNESS
[Disease:__________________________] : Disease: [unfilled] [de-identified] : Patient is a 72 year old male referred by Dr. 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 Labs dated  01/12/2018 show Gamma globulin fraction: 2.2, M spike unable to quantitate but a weak gamma migrating paraprotein identified. Immunofixation identified IgM Kappa band.  Also noted ALVARO 1:160 IFA pattern: speckled.  Patient reports having tingling sensation in hands, feet, arms, legs, and right side face. Patient reports having fatigue.  \par He was diagnosed with CIPD with anti-MAG antibody couple of years ago and has progression. He complains of paresthesias in legs up to knee level and arms.  He feels his balance is affected.  He was treated with IVIG pulse treatment and there was improvement in the reflexes but not with paresthesias.  [de-identified] : Mr. Aguilar presents today for followup for IgM monoclonal gammopathy and neuropathic pain.  States he is having some headaches, sinus pressure and nausea.

## 2019-07-16 NOTE — CONSULT LETTER
[Dear  ___] : Dear  [unfilled], [Consult Letter:] : I had the pleasure of evaluating your patient, [unfilled]. [Please see my note below.] : Please see my note below. [Consult Closing:] : Thank you very much for allowing me to participate in the care of this patient.  If you have any questions, please do not hesitate to contact me. [Sincerely,] : Sincerely, [FreeTextEntry3] : Angy Quijano MD\par [facility] [place] at [facility]\par

## 2019-07-16 NOTE — PHYSICAL EXAM
[Fully active, able to carry on all pre-disease performance without restriction] : Status 0 - Fully active, able to carry on all pre-disease performance without restriction [Normal] : affect appropriate [de-identified] : decreased sensation LE and hands

## 2019-07-16 NOTE — REVIEW OF SYSTEMS
[Fatigue] : fatigue [Negative] : Allergic/Immunologic [Fever] : no fever [Recent Change In Weight] : ~T no recent weight change [Eye Pain] : no eye pain [Vision Problems] : no vision problems [Nosebleeds] : no nosebleeds [Hoarseness] : no hoarseness [Mucosal Pain] : no mucosal pain [Chest Pain] : no chest pain [Lower Ext Edema] : no lower extremity edema [Shortness Of Breath] : no shortness of breath [Cough] : no cough [Abdominal Pain] : no abdominal pain [Constipation] : no constipation [Diarrhea] : no diarrhea [Dysuria] : no dysuria [Joint Stiffness] : no joint stiffness [Insomnia] : no insomnia [Anxiety] : no anxiety [Depression] : no depression [Hot Flashes] : no hot flashes [FreeTextEntry2] : - 1 lb [de-identified] : progressive paraesthesia in legs hands feel and tingling in right side of face/ neuropathic pain-on Neurontin

## 2019-07-16 NOTE — ASSESSMENT
[FreeTextEntry1] : 72 active man referred by Dr. 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 Anti MAG,IgM\par - next treatment in January 2019\par \par \par \par \par

## 2019-10-16 ENCOUNTER — APPOINTMENT (OUTPATIENT)
Dept: HEMATOLOGY ONCOLOGY | Facility: CLINIC | Age: 74
End: 2019-10-16
Payer: MEDICARE

## 2019-10-16 ENCOUNTER — RESULT REVIEW (OUTPATIENT)
Age: 74
End: 2019-10-16

## 2019-10-16 VITALS
OXYGEN SATURATION: 98 % | BODY MASS INDEX: 30.26 KG/M2 | TEMPERATURE: 98.6 F | HEART RATE: 56 BPM | HEIGHT: 69.69 IN | SYSTOLIC BLOOD PRESSURE: 151 MMHG | RESPIRATION RATE: 18 BRPM | WEIGHT: 209 LBS | DIASTOLIC BLOOD PRESSURE: 96 MMHG

## 2019-10-16 PROCEDURE — 99214 OFFICE O/P EST MOD 30 MIN: CPT

## 2019-10-16 NOTE — ASSESSMENT
[FreeTextEntry1] : 72 active man referred by Dr. 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

## 2019-10-16 NOTE — HISTORY OF PRESENT ILLNESS
[Disease:__________________________] : Disease: [unfilled] [de-identified] : Patient is a 72 year old male referred by Dr. 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 Labs dated  01/12/2018 show Gamma globulin fraction: 2.2, M spike unable to quantitate but a weak gamma migrating paraprotein identified. Immunofixation identified IgM Kappa band.  Also noted ALVARO 1:160 IFA pattern: speckled.  Patient reports having tingling sensation in hands, feet, arms, legs, and right side face. Patient reports having fatigue.  \par He was diagnosed with CIPD with anti-MAG antibody couple of years ago and has progression. He complains of paresthesias in legs up to knee level and arms.  He feels his balance is affected.  He was treated with IVIG pulse treatment and there was improvement in the reflexes but not with paresthesias.  [de-identified] : Mr. Aguilar presents today for followup for IgM monoclonal gammopathy and neuropathic pain.  Paresthesia continue in hands, legs, and face. Pt also states occasional headaches. Pt denies recent infections. States he needs to get the flu shot.

## 2019-10-16 NOTE — REVIEW OF SYSTEMS
[Fatigue] : fatigue [Negative] : Allergic/Immunologic [Fever] : no fever [Recent Change In Weight] : ~T no recent weight change [Eye Pain] : no eye pain [Vision Problems] : no vision problems [Nosebleeds] : no nosebleeds [Hoarseness] : no hoarseness [Mucosal Pain] : no mucosal pain [Chest Pain] : no chest pain [Lower Ext Edema] : no lower extremity edema [Shortness Of Breath] : no shortness of breath [Cough] : no cough [Abdominal Pain] : no abdominal pain [Constipation] : no constipation [Diarrhea] : no diarrhea [Dysuria] : no dysuria [Joint Stiffness] : no joint stiffness [Insomnia] : no insomnia [Anxiety] : no anxiety [Depression] : no depression [Hot Flashes] : no hot flashes [FreeTextEntry2] : headaches [de-identified] : progressive paraesthesia in legs hands feel and tingling in right side of face/ neuropathic pain-on Neurontin

## 2019-10-16 NOTE — PHYSICAL EXAM
[Fully active, able to carry on all pre-disease performance without restriction] : Status 0 - Fully active, able to carry on all pre-disease performance without restriction [Normal] : affect appropriate [de-identified] : decreased sensation LE and hands

## 2019-10-16 NOTE — CONSULT LETTER
[Dear  ___] : Dear  [unfilled], [Please see my note below.] : Please see my note below. [Consult Letter:] : I had the pleasure of evaluating your patient, [unfilled]. [Sincerely,] : Sincerely, [Consult Closing:] : Thank you very much for allowing me to participate in the care of this patient.  If you have any questions, please do not hesitate to contact me. [FreeTextEntry3] : Angy Quijano MD\par [facility] [place] at [facility]\par

## 2019-10-28 ENCOUNTER — APPOINTMENT (OUTPATIENT)
Dept: INTERNAL MEDICINE | Facility: CLINIC | Age: 74
End: 2019-10-28
Payer: MEDICARE

## 2019-10-28 ENCOUNTER — NON-APPOINTMENT (OUTPATIENT)
Age: 74
End: 2019-10-28

## 2019-10-28 VITALS
HEIGHT: 69 IN | WEIGHT: 204 LBS | DIASTOLIC BLOOD PRESSURE: 90 MMHG | SYSTOLIC BLOOD PRESSURE: 150 MMHG | BODY MASS INDEX: 30.21 KG/M2

## 2019-10-28 DIAGNOSIS — R06.00 DYSPNEA, UNSPECIFIED: ICD-10-CM

## 2019-10-28 PROCEDURE — 94010 BREATHING CAPACITY TEST: CPT

## 2019-10-28 PROCEDURE — 99214 OFFICE O/P EST MOD 30 MIN: CPT | Mod: 25

## 2019-10-28 NOTE — HISTORY OF PRESENT ILLNESS
[FreeTextEntry1] : Evaluation for dyspnea. [de-identified] : Patient with a history of CVA high DP on Rituxan every 6 months. Over the past 6 months he complains of slight dyspnea which occurs at rest. It seems to get an ache anymore prominent. He feels the need to take deep breaths often. However he is able to exercise regularly including hiking and weight lifting. He has no problems with his breathing when exercising. He is also on Neurontin and trilleptic. There is no history of cardiopulmonary disease. He is a nonsmoker. He denies chest pain, orthopnea, PND or edema.

## 2019-10-28 NOTE — ASSESSMENT
[FreeTextEntry1] : Patient has dyspnea which only occurs at rest. He has no dyspnea with significant exertion. His spirometry is completely normal. 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 He is to continue with his antidepressant. I have reassured the patient. He is to call me within 2-3 months to let you know how he is doing.

## 2019-11-06 ENCOUNTER — RX RENEWAL (OUTPATIENT)
Age: 74
End: 2019-11-06

## 2020-01-23 ENCOUNTER — RESULT REVIEW (OUTPATIENT)
Age: 75
End: 2020-01-23

## 2020-01-23 ENCOUNTER — APPOINTMENT (OUTPATIENT)
Dept: HEMATOLOGY ONCOLOGY | Facility: CLINIC | Age: 75
End: 2020-01-23
Payer: MEDICARE

## 2020-01-23 VITALS
HEART RATE: 52 BPM | OXYGEN SATURATION: 100 % | RESPIRATION RATE: 16 BRPM | SYSTOLIC BLOOD PRESSURE: 164 MMHG | HEIGHT: 68.98 IN | WEIGHT: 209.99 LBS | DIASTOLIC BLOOD PRESSURE: 96 MMHG | TEMPERATURE: 97.7 F | BODY MASS INDEX: 31.1 KG/M2

## 2020-01-23 PROCEDURE — 99214 OFFICE O/P EST MOD 30 MIN: CPT

## 2020-01-23 RX ORDER — PREGABALIN 300 MG/1
CAPSULE ORAL
Refills: 0 | Status: COMPLETED | COMMUNITY
End: 2020-01-23

## 2020-01-23 NOTE — REVIEW OF SYSTEMS
[Fatigue] : fatigue [Negative] : Allergic/Immunologic [Fever] : no fever [Recent Change In Weight] : ~T no recent weight change [Eye Pain] : no eye pain [Vision Problems] : no vision problems [Nosebleeds] : no nosebleeds [Hoarseness] : no hoarseness [Mucosal Pain] : no mucosal pain [Chest Pain] : no chest pain [Lower Ext Edema] : no lower extremity edema [Cough] : no cough [Shortness Of Breath] : no shortness of breath [Abdominal Pain] : no abdominal pain [Constipation] : no constipation [Diarrhea] : no diarrhea [Joint Stiffness] : no joint stiffness [Dysuria] : no dysuria [Insomnia] : no insomnia [Anxiety] : no anxiety [Depression] : no depression [Hot Flashes] : no hot flashes [FreeTextEntry2] : headaches [de-identified] : progressive paraesthesia in legs hands feel and tingling in right side of face/ neuropathic pain-on Neurontin

## 2020-01-23 NOTE — PHYSICAL EXAM
[Fully active, able to carry on all pre-disease performance without restriction] : Status 0 - Fully active, able to carry on all pre-disease performance without restriction [Normal] : normal appearance, no rash, nodules, vesicles, ulcers, erythema [de-identified] : decreased sensation LE and hands

## 2020-01-23 NOTE — ASSESSMENT
[FreeTextEntry1] : 72 active man referred by Dr. 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.\par \par - rituximab 3 rd round - 375mg/m2 x 4 May 2019\par - change to rituximab 1 gm q 2 weeks - Jan 2020\par - patient still symptomatic with neuropathy \par - IgM decreased, MAG ab negative, but with continued neuropathy, improvement in reflexes.\par - follow up with neurology since had flare up after last rituximab\par - if retreated then 1 gm x 1 q 2 weeks \par - on Viread - continue \par - patient undergoing stem cell treatment - completed one session\par - he does more physical activity now, less fatigue\par - on Lyrica- decreased pain level - stopped taking \par \par \par \par \par \par \par

## 2020-01-23 NOTE — HISTORY OF PRESENT ILLNESS
[Disease:__________________________] : Disease: [unfilled] [de-identified] : Patient is a 72 year old male referred by Dr. 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 Labs dated  01/12/2018 show Gamma globulin fraction: 2.2, M spike unable to quantitate but a weak gamma migrating paraprotein identified. Immunofixation identified IgM Kappa band.  Also noted ALVARO 1:160 IFA pattern: speckled.  Patient reports having tingling sensation in hands, feet, arms, legs, and right side face. Patient reports having fatigue.  \par He was diagnosed with CIPD with anti-MAG antibody couple of years ago and has progression. He complains of paresthesias in legs up to knee level and arms.  He feels his balance is affected.  He was treated with IVIG pulse treatment and there was improvement in the reflexes but not with paresthesias.  [de-identified] : Mr. Aguilar presents today for followup for IgM monoclonal gammopathy and neuropathic pain.  Paresthesia continue in feet, and face, mainly right side.  has follow up with Dr. Newberry (Neuro specializing in CIDP) next week.

## 2020-01-23 NOTE — CONSULT LETTER
[Consult Letter:] : I had the pleasure of evaluating your patient, [unfilled]. [Dear  ___] : Dear  [unfilled], [Please see my note below.] : Please see my note below. [Consult Closing:] : Thank you very much for allowing me to participate in the care of this patient.  If you have any questions, please do not hesitate to contact me. [Sincerely,] : Sincerely, [FreeTextEntry3] : Angy Quijano MD\par [facility] [place] at [facility]\par

## 2020-04-23 ENCOUNTER — APPOINTMENT (OUTPATIENT)
Dept: HEMATOLOGY ONCOLOGY | Facility: CLINIC | Age: 75
End: 2020-04-23

## 2020-07-07 ENCOUNTER — RESULT REVIEW (OUTPATIENT)
Age: 75
End: 2020-07-07

## 2020-07-07 ENCOUNTER — APPOINTMENT (OUTPATIENT)
Dept: HEMATOLOGY ONCOLOGY | Facility: CLINIC | Age: 75
End: 2020-07-07
Payer: MEDICARE

## 2020-07-07 VITALS
DIASTOLIC BLOOD PRESSURE: 88 MMHG | BODY MASS INDEX: 27.44 KG/M2 | TEMPERATURE: 97.9 F | HEART RATE: 58 BPM | RESPIRATION RATE: 18 BRPM | OXYGEN SATURATION: 98 % | WEIGHT: 185.3 LBS | HEIGHT: 68.98 IN | SYSTOLIC BLOOD PRESSURE: 165 MMHG

## 2020-07-07 DIAGNOSIS — R76.0 RAISED ANTIBODY TITER: ICD-10-CM

## 2020-07-07 PROCEDURE — 99214 OFFICE O/P EST MOD 30 MIN: CPT

## 2020-07-07 RX ORDER — OXCARBAZEPINE 150 MG/1
150 TABLET, FILM COATED ORAL
Refills: 0 | Status: COMPLETED | COMMUNITY
End: 2020-07-07

## 2020-07-07 RX ORDER — ZOLPIDEM TARTRATE 10 MG/1
10 TABLET ORAL
Qty: 30 | Refills: 2 | Status: COMPLETED | COMMUNITY
Start: 2018-10-01 | End: 2020-07-07

## 2020-07-24 NOTE — REVIEW OF SYSTEMS
[Fatigue] : fatigue [Negative] : Allergic/Immunologic [Fever] : no fever [Recent Change In Weight] : ~T no recent weight change [Eye Pain] : no eye pain [Vision Problems] : no vision problems [Nosebleeds] : no nosebleeds [Mucosal Pain] : no mucosal pain [Hoarseness] : no hoarseness [Chest Pain] : no chest pain [Lower Ext Edema] : no lower extremity edema [Shortness Of Breath] : no shortness of breath [Cough] : no cough [Abdominal Pain] : no abdominal pain [Constipation] : no constipation [Diarrhea] : no diarrhea [Dysuria] : no dysuria [Insomnia] : no insomnia [Anxiety] : no anxiety [Joint Stiffness] : no joint stiffness [Depression] : no depression [Hot Flashes] : no hot flashes [FreeTextEntry2] : headaches [de-identified] : progressive paraesthesia in legs hands feel and tingling in right side of face/ neuropathic pain-on Neurontin

## 2020-07-24 NOTE — ASSESSMENT
[FreeTextEntry1] : 75 active man referred by Dr. 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

## 2020-07-24 NOTE — CONSULT LETTER
[Consult Letter:] : I had the pleasure of evaluating your patient, [unfilled]. [Please see my note below.] : Please see my note below. [Dear  ___] : Dear  [unfilled], [Consult Closing:] : Thank you very much for allowing me to participate in the care of this patient.  If you have any questions, please do not hesitate to contact me. [Sincerely,] : Sincerely, [FreeTextEntry3] : Anyg Quijano MD\par [facility] [place] at [facility]\par

## 2020-07-24 NOTE — HISTORY OF PRESENT ILLNESS
[Disease:__________________________] : Disease: [unfilled] [de-identified] : Patient is a 72 year old male referred by Dr. 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 Labs dated  01/12/2018 show Gamma globulin fraction: 2.2, M spike unable to quantitate but a weak gamma migrating paraprotein identified. Immunofixation identified IgM Kappa band.  Also noted ALVARO 1:160 IFA pattern: speckled.  Patient reports having tingling sensation in hands, feet, arms, legs, and right side face. Patient reports having fatigue.  \par He was diagnosed with CIPD with anti-MAG antibody couple of years ago and has progression. He complains of paresthesias in legs up to knee level and arms.  He feels his balance is affected.  He was treated with IVIG pulse treatment and there was improvement in the reflexes but not with paresthesias.  [de-identified] : Mr. Aguilar presents today for followup for IgM monoclonal gammopathy and neuropathic pain.  Paresthesia continue in feet, and face (improved.).  Following up with Neuro 8/2/2020

## 2020-07-24 NOTE — PHYSICAL EXAM
[Fully active, able to carry on all pre-disease performance without restriction] : Status 0 - Fully active, able to carry on all pre-disease performance without restriction [Normal] : affect appropriate [de-identified] : decreased sensation LE and hands

## 2020-08-12 ENCOUNTER — RESULT REVIEW (OUTPATIENT)
Age: 75
End: 2020-08-12

## 2020-08-12 ENCOUNTER — APPOINTMENT (OUTPATIENT)
Dept: HEMATOLOGY ONCOLOGY | Facility: CLINIC | Age: 75
End: 2020-08-12
Payer: MEDICARE

## 2020-08-12 PROCEDURE — 99499A: CUSTOM | Mod: NC

## 2020-10-22 ENCOUNTER — RX RENEWAL (OUTPATIENT)
Age: 75
End: 2020-10-22

## 2020-10-27 ENCOUNTER — APPOINTMENT (OUTPATIENT)
Dept: HEMATOLOGY ONCOLOGY | Facility: CLINIC | Age: 75
End: 2020-10-27

## 2020-10-28 ENCOUNTER — APPOINTMENT (OUTPATIENT)
Dept: INTERNAL MEDICINE | Facility: CLINIC | Age: 75
End: 2020-10-28

## 2020-11-06 ENCOUNTER — RESULT REVIEW (OUTPATIENT)
Age: 75
End: 2020-11-06

## 2020-12-11 ENCOUNTER — APPOINTMENT (OUTPATIENT)
Dept: INTERNAL MEDICINE | Facility: CLINIC | Age: 75
End: 2020-12-11
Payer: MEDICARE

## 2020-12-11 VITALS
DIASTOLIC BLOOD PRESSURE: 90 MMHG | HEART RATE: 60 BPM | TEMPERATURE: 97 F | SYSTOLIC BLOOD PRESSURE: 160 MMHG | WEIGHT: 187 LBS | HEIGHT: 68 IN | OXYGEN SATURATION: 98 % | BODY MASS INDEX: 28.34 KG/M2

## 2020-12-11 DIAGNOSIS — N40.1 BENIGN PROSTATIC HYPERPLASIA WITH LOWER URINARY TRACT SYMPMS: ICD-10-CM

## 2020-12-11 DIAGNOSIS — N13.8 BENIGN PROSTATIC HYPERPLASIA WITH LOWER URINARY TRACT SYMPMS: ICD-10-CM

## 2020-12-11 DIAGNOSIS — Z23 ENCOUNTER FOR IMMUNIZATION: ICD-10-CM

## 2020-12-11 PROCEDURE — 99072 ADDL SUPL MATRL&STAF TM PHE: CPT

## 2020-12-11 PROCEDURE — 99397 PER PM REEVAL EST PAT 65+ YR: CPT | Mod: 25

## 2020-12-11 PROCEDURE — G0009: CPT

## 2020-12-11 PROCEDURE — 90670 PCV13 VACCINE IM: CPT

## 2020-12-11 NOTE — ASSESSMENT
[FreeTextEntry1] : Despite history of sensory neuropathy patient is doing clinically quite well. Blood pressure is noted to be elevated at 140/90. Patient received Prevnar 13 injection today. We will check routine labs. Patient is to call me next week for lab results

## 2020-12-11 NOTE — HISTORY OF PRESENT ILLNESS
[FreeTextEntry1] : Routine physical exam [de-identified] : This is a 75-year-old male with a history of polyneuropathy possible CI CPE. He also is known to have an IgM gammopathy followed by hematology. He is followed by neurology and hematology every 6 months. He is able to hike long distances almost daily. He remains very active. He has a history of stable peripheral neuropathy with muscle fasciculations. He takes Rituxan every 6 months. He denies chronic complaints except for chronic sensory polyneuropathy. Medications were reviewed. He saw urology more than one year ago. He had a green light laser procedure of the prostate several years ago. His blood pressure at home is usually 130/80. He knows his blood pressure goes up when he goes to a doctor's office.

## 2020-12-16 LAB
25(OH)D3 SERPL-MCNC: 40.2 NG/ML
ALBUMIN SERPL ELPH-MCNC: 4.5 G/DL
ALP BLD-CCNC: 91 U/L
ALT SERPL-CCNC: 27 U/L
ANION GAP SERPL CALC-SCNC: 12 MMOL/L
AST SERPL-CCNC: 36 U/L
BASOPHILS # BLD AUTO: 0.05 K/UL
BASOPHILS NFR BLD AUTO: 1.2 %
BILIRUB SERPL-MCNC: 0.5 MG/DL
BUN SERPL-MCNC: 27 MG/DL
CALCIUM SERPL-MCNC: 9.6 MG/DL
CHLORIDE SERPL-SCNC: 100 MMOL/L
CHOLEST SERPL-MCNC: 218 MG/DL
CO2 SERPL-SCNC: 26 MMOL/L
CREAT SERPL-MCNC: 1 MG/DL
EOSINOPHIL # BLD AUTO: 0.13 K/UL
EOSINOPHIL NFR BLD AUTO: 3 %
ESTIMATED AVERAGE GLUCOSE: 123 MG/DL
GLUCOSE SERPL-MCNC: 81 MG/DL
HBA1C MFR BLD HPLC: 5.9 %
HCT VFR BLD CALC: 49 %
HDLC SERPL-MCNC: 87 MG/DL
HGB BLD-MCNC: 15.4 G/DL
IMM GRANULOCYTES NFR BLD AUTO: 0 %
LDLC SERPL CALC-MCNC: 121 MG/DL
LYMPHOCYTES # BLD AUTO: 1.11 K/UL
LYMPHOCYTES NFR BLD AUTO: 26 %
MAN DIFF?: NORMAL
MCHC RBC-ENTMCNC: 29.9 PG
MCHC RBC-ENTMCNC: 31.4 GM/DL
MCV RBC AUTO: 95.1 FL
MONOCYTES # BLD AUTO: 0.69 K/UL
MONOCYTES NFR BLD AUTO: 16.2 %
NEUTROPHILS # BLD AUTO: 2.29 K/UL
NEUTROPHILS NFR BLD AUTO: 53.6 %
NONHDLC SERPL-MCNC: 131 MG/DL
PLATELET # BLD AUTO: 224 K/UL
POTASSIUM SERPL-SCNC: 4.8 MMOL/L
PROT SERPL-MCNC: 6.9 G/DL
PSA SERPL-MCNC: 5.95 NG/ML
RBC # BLD: 5.15 M/UL
RBC # FLD: 14 %
SODIUM SERPL-SCNC: 137 MMOL/L
TRIGL SERPL-MCNC: 50 MG/DL
TSH SERPL-ACNC: 1.48 UIU/ML
WBC # FLD AUTO: 4.27 K/UL

## 2020-12-23 ENCOUNTER — TRANSCRIPTION ENCOUNTER (OUTPATIENT)
Age: 75
End: 2020-12-23

## 2021-02-02 LAB — H PYLORI AG STL QL: NOT DETECTED

## 2021-02-05 ENCOUNTER — APPOINTMENT (OUTPATIENT)
Dept: GASTROENTEROLOGY | Facility: CLINIC | Age: 76
End: 2021-02-05
Payer: MEDICARE

## 2021-02-05 VITALS
HEART RATE: 71 BPM | SYSTOLIC BLOOD PRESSURE: 130 MMHG | DIASTOLIC BLOOD PRESSURE: 76 MMHG | HEIGHT: 68 IN | BODY MASS INDEX: 28.04 KG/M2 | WEIGHT: 185 LBS | TEMPERATURE: 97.9 F

## 2021-02-05 DIAGNOSIS — R19.7 DIARRHEA, UNSPECIFIED: ICD-10-CM

## 2021-02-05 PROCEDURE — 99214 OFFICE O/P EST MOD 30 MIN: CPT

## 2021-02-05 PROCEDURE — 99072 ADDL SUPL MATRL&STAF TM PHE: CPT

## 2021-02-05 RX ORDER — ZOLPIDEM TARTRATE 10 MG/1
10 TABLET ORAL
Qty: 30 | Refills: 2 | Status: DISCONTINUED | COMMUNITY
Start: 2020-01-30 | End: 2021-02-05

## 2021-02-05 NOTE — HISTORY OF PRESENT ILLNESS
[FreeTextEntry1] : Mr. Aguilar is a pleasant 75M h/o MGUS, Waldenstrom's macroglobulinemia, prostatitis, CIDP, neuropathy who returns for f/u.\par bipin Was last seen 5/19 for dyspepsia, used omeprazole at that time with symptom relief, stopped on his own.\par \par Early January 2021 he at a chocolate cake (very rarely eats this), developed nausea/vomiting and diarrhea shortly thereafter, lasted for a few days.  Saw blood in his stool once, bright red, separate from his stool. No blood in his vomitus. \par \par N/V resolved, however he has continued to have frequent liquid BMs in the morning mainly.  Will wake up in the morning, have fecal urgency and pass liquid brown stool, mainly yellowish liquid with mucus.  This may occur a few more times during the day.\par \par He has been taking Imodium 2-3 times daily with decent symptom control.\par \par No fevers, chills, weight change, black stool.\par \angel Has tried omeprazole for the past 2 weeks without improvement.\par \angel Used to work as a  in the [place], has been tested for Giardia in the past which was negative.\par \par No other recent new medications.\par \par Colonoscopy 9/25/18:\par Impression:\par 1. Sigmoid diverticulosis\par 2. Internal hemorrhoids

## 2021-02-05 NOTE — ASSESSMENT
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

## 2021-02-09 LAB
C DIFF TOX GENS STL QL NAA+PROBE: NORMAL
CDIFF BY PCR: NOT DETECTED

## 2021-02-11 DIAGNOSIS — A04.5 CAMPYLOBACTER ENTERITIS: ICD-10-CM

## 2021-02-11 LAB
BACTERIA STL CULT: ABNORMAL
DEPRECATED O AND P PREP STL: NORMAL
E HISTOLYT AG STL IA-ACNC: NOT DETECTED
G LAMBLIA AG STL QL: NORMAL

## 2021-02-18 LAB — PANCREATIC ELASTASE, FECAL: 480

## 2021-02-26 LAB — BACTERIA STL CULT: NORMAL

## 2021-03-22 ENCOUNTER — RESULT REVIEW (OUTPATIENT)
Age: 76
End: 2021-03-22

## 2021-03-22 ENCOUNTER — APPOINTMENT (OUTPATIENT)
Dept: HEMATOLOGY ONCOLOGY | Facility: CLINIC | Age: 76
End: 2021-03-22
Payer: MEDICARE

## 2021-03-22 VITALS
TEMPERATURE: 96.4 F | OXYGEN SATURATION: 100 % | HEIGHT: 69.6 IN | WEIGHT: 187 LBS | RESPIRATION RATE: 16 BRPM | BODY MASS INDEX: 27.07 KG/M2 | SYSTOLIC BLOOD PRESSURE: 147 MMHG | DIASTOLIC BLOOD PRESSURE: 97 MMHG | HEART RATE: 66 BPM

## 2021-03-22 PROCEDURE — 99214 OFFICE O/P EST MOD 30 MIN: CPT

## 2021-03-22 PROCEDURE — 99072 ADDL SUPL MATRL&STAF TM PHE: CPT

## 2021-03-22 RX ORDER — GABAPENTIN 300 MG/1
300 CAPSULE ORAL
Qty: 270 | Refills: 0 | Status: COMPLETED | COMMUNITY
Start: 2018-06-08 | End: 2021-03-22

## 2021-03-22 RX ORDER — GABAPENTIN 400 MG/1
400 CAPSULE ORAL
Qty: 180 | Refills: 0 | Status: ACTIVE | COMMUNITY
Start: 2021-02-16

## 2021-03-22 RX ORDER — AZITHROMYCIN 500 MG/1
500 TABLET, FILM COATED ORAL DAILY
Qty: 7 | Refills: 0 | Status: COMPLETED | COMMUNITY
Start: 2021-02-11 | End: 2021-03-22

## 2021-03-22 NOTE — PHYSICAL EXAM
[Fully active, able to carry on all pre-disease performance without restriction] : Status 0 - Fully active, able to carry on all pre-disease performance without restriction [Normal] : affect appropriate [de-identified] : decreased sensation LE and hands

## 2021-03-22 NOTE — CONSULT LETTER
[Dear  ___] : Dear  [unfilled], [Consult Letter:] : I had the pleasure of evaluating your patient, [unfilled]. [Please see my note below.] : Please see my note below. [Consult Closing:] : Thank you very much for allowing me to participate in the care of this patient.  If you have any questions, please do not hesitate to contact me. [Sincerely,] : Sincerely, [FreeTextEntry3] : Angy Quijano MD\par [facility] [place] at [facility]\par

## 2021-03-22 NOTE — REVIEW OF SYSTEMS
[Loss of Hearing] : loss of hearing [Lower Ext Edema] : lower extremity edema [Insomnia] : insomnia [Negative] : Musculoskeletal [Fever] : no fever [Fatigue] : no fatigue [Recent Change In Weight] : ~T no recent weight change [Eye Pain] : no eye pain [Vision Problems] : no vision problems [Nosebleeds] : no nosebleeds [Hoarseness] : no hoarseness [Mucosal Pain] : no mucosal pain [Chest Pain] : no chest pain [Palpitations] : no palpitations [Shortness Of Breath] : no shortness of breath [Cough] : no cough [Abdominal Pain] : no abdominal pain [Constipation] : no constipation [Diarrhea] : no diarrhea [Dysuria] : no dysuria [Joint Stiffness] : no joint stiffness [Anxiety] : no anxiety [Depression] : no depression [Hot Flashes] : no hot flashes [FreeTextEntry2] : headaches [FreeTextEntry4] : occurring over several years [FreeTextEntry5] : periodically  [FreeTextEntry8] : prostate issue [de-identified] : progressive paraesthesia in legs hands feel and tingling in right side of face/ neuropathic pain-on Neurontin

## 2021-03-22 NOTE — ASSESSMENT
[FreeTextEntry1] : 75 active man referred by Dr. 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.\par \par - rituximab 3 rd round - 375mg/m2 x 4 May 2019\par - change to rituximab 1 gm q 2 weeks - Jan 2020, August 2020.\par - patient still symptomatic with neuropathy, but overall stable\par - IgM decreased, MAG ab negative, but with continued neuropathy, improvement in reflexes.\par - follow up with neurology since had flare up after last rituximab\par - if retreated then 1 gm x 1 q 2 weeks \par - on Viread - continue \par - he does more physical activity now, less fatigue\par - on Lyrica- decreased pain level - stopped taking \par - finished vaccination for COVID Feb 9, 2021\par - evening pain and discomfort in the legs, increase gabapentin to 800 mg at night, 400 mg in am \par \par \par \par \par \par

## 2021-03-22 NOTE — HISTORY OF PRESENT ILLNESS
[Disease:__________________________] : Disease: [unfilled] [de-identified] : Patient is a 72 year old male referred by Dr. 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 Labs dated  01/12/2018 show Gamma globulin fraction: 2.2, M spike unable to quantitate but a weak gamma migrating paraprotein identified. Immunofixation identified IgM Kappa band.  Also noted ALVARO 1:160 IFA pattern: speckled.  Patient reports having tingling sensation in hands, feet, arms, legs, and right side face. Patient reports having fatigue.  \par He was diagnosed with CIPD with anti-MAG antibody couple of years ago and has progression. He complains of paresthesias in legs up to knee level and arms.  He feels his balance is affected.  He was treated with IVIG pulse treatment and there was improvement in the reflexes but not with paresthesias.  [de-identified] : Mr. Aguilar presents today for followup for IgM monoclonal gammopathy and neuropathic pain.  Patient still complains about pain in the legs, feet, and face (improved.).  Patient also complains of "coldness" and tingling in the hands. Patient received both vaccination for COVID.

## 2021-04-15 ENCOUNTER — RESULT REVIEW (OUTPATIENT)
Age: 76
End: 2021-04-15

## 2021-05-21 ENCOUNTER — APPOINTMENT (OUTPATIENT)
Dept: INTERNAL MEDICINE | Facility: CLINIC | Age: 76
End: 2021-05-21
Payer: MEDICARE

## 2021-05-21 VITALS
SYSTOLIC BLOOD PRESSURE: 120 MMHG | DIASTOLIC BLOOD PRESSURE: 80 MMHG | OXYGEN SATURATION: 97 % | BODY MASS INDEX: 27.4 KG/M2 | TEMPERATURE: 97.3 F | WEIGHT: 185 LBS | HEIGHT: 69 IN | HEART RATE: 66 BPM

## 2021-05-21 PROCEDURE — 99072 ADDL SUPL MATRL&STAF TM PHE: CPT

## 2021-05-21 PROCEDURE — 99213 OFFICE O/P EST LOW 20 MIN: CPT

## 2021-05-21 NOTE — HISTORY OF PRESENT ILLNESS
[FreeTextEntry1] : Preop medical clearance [de-identified] : 76-year-old male scheduled for TURP, cystoscopy and laser laser lithotripsy on 5-24. Patient has a history of sensory polyneuropathy followed by neurology and monoclonal gammopathy of unknown significance followed by hematology. He received Rituxan every 6 months. There is no cardiovascular problems. The patient remains quite active able to row and bicycle without difficulty. Past surgery includes green light laser surgery on the prostate in 2018, left shoulder surgery 13 years ago and laminotomy on the back 10 years ago. He had an appendectomy age 19. Medications were reviewed. His main complaint is pain in the feet to the sensory neuropathy. Despite this he remains quite active.

## 2021-05-25 ENCOUNTER — RESULT REVIEW (OUTPATIENT)
Age: 76
End: 2021-05-25

## 2021-05-28 ENCOUNTER — RX RENEWAL (OUTPATIENT)
Age: 76
End: 2021-05-28

## 2021-08-20 ENCOUNTER — TRANSCRIPTION ENCOUNTER (OUTPATIENT)
Age: 76
End: 2021-08-20

## 2021-08-23 ENCOUNTER — RESULT REVIEW (OUTPATIENT)
Age: 76
End: 2021-08-23

## 2021-08-23 ENCOUNTER — APPOINTMENT (OUTPATIENT)
Dept: INTERNAL MEDICINE | Facility: CLINIC | Age: 76
End: 2021-08-23
Payer: MEDICARE

## 2021-08-23 VITALS
OXYGEN SATURATION: 99 % | DIASTOLIC BLOOD PRESSURE: 90 MMHG | TEMPERATURE: 97.2 F | HEIGHT: 69 IN | BODY MASS INDEX: 27.7 KG/M2 | WEIGHT: 187 LBS | SYSTOLIC BLOOD PRESSURE: 130 MMHG | HEART RATE: 66 BPM

## 2021-08-23 PROCEDURE — 99213 OFFICE O/P EST LOW 20 MIN: CPT

## 2021-08-23 NOTE — ASSESSMENT
[FreeTextEntry1] : The etiology of low grade temperature and fatigue is unclear. Certainly this may be Lyme disease. I suspect we are doing with a tickborne illness. I will send patient for lab work including Lyme test and tick borne illness labs. Will continue doxycycline 100 mg b.i.d. at this time. Patient is to call for lab results.

## 2021-08-23 NOTE — PHYSICAL EXAM
[No Acute Distress] : no acute distress [Well Nourished] : well nourished [Well Developed] : well developed [Well-Appearing] : well-appearing [Normal Sclera/Conjunctiva] : normal sclera/conjunctiva [PERRL] : pupils equal round and reactive to light [EOMI] : extraocular movements intact [Normal Outer Ear/Nose] : the outer ears and nose were normal in appearance [Normal Oropharynx] : the oropharynx was normal [No JVD] : no jugular venous distention [No Lymphadenopathy] : no lymphadenopathy [Supple] : supple [Thyroid Normal, No Nodules] : the thyroid was normal and there were no nodules present [No Respiratory Distress] : no respiratory distress  [No Accessory Muscle Use] : no accessory muscle use [Clear to Auscultation] : lungs were clear to auscultation bilaterally [Normal Rate] : normal rate  [Regular Rhythm] : with a regular rhythm [Normal S1, S2] : normal S1 and S2 [No Carotid Bruits] : no carotid bruits [No Abdominal Bruit] : a ~M bruit was not heard ~T in the abdomen [No Varicosities] : no varicosities [Pedal Pulses Present] : the pedal pulses are present [No Edema] : there was no peripheral edema [No Palpable Aorta] : no palpable aorta [No Extremity Clubbing/Cyanosis] : no extremity clubbing/cyanosis [Soft] : abdomen soft [Non Tender] : non-tender [Non-distended] : non-distended [No Masses] : no abdominal mass palpated [No HSM] : no HSM [Normal Bowel Sounds] : normal bowel sounds [Normal Posterior Cervical Nodes] : no posterior cervical lymphadenopathy [Normal Anterior Cervical Nodes] : no anterior cervical lymphadenopathy [No CVA Tenderness] : no CVA  tenderness [No Spinal Tenderness] : no spinal tenderness [No Joint Swelling] : no joint swelling [Grossly Normal Strength/Tone] : grossly normal strength/tone [No Rash] : no rash [Coordination Grossly Intact] : coordination grossly intact [No Focal Deficits] : no focal deficits [Normal Gait] : normal gait [Deep Tendon Reflexes (DTR)] : deep tendon reflexes were 2+ and symmetric [Normal Affect] : the affect was normal [Normal Insight/Judgement] : insight and judgment were intact [de-identified] : 1/6 [facility]

## 2021-08-23 NOTE — HISTORY OF PRESENT ILLNESS
[FreeTextEntry1] : Fatigue x2 weeks. [de-identified] : Patient has been feeling poorly for the past 2 weeks with marked fatigue, occasional shoulder aches, backache and exhaustion. He has had low-grade temperature of 99. He did have one temperature spike to 101. He went to an urgent care clinic on 8:15 and had a negative culture test. He states he also has underlying test at that time. He went to the emergency room on August 17 andspecific diagnosis was made. He states all tests were negative. He received Rituxan every 6 months as last dose being about 5 months ago. He denies cough, chest pain, wheezing or shortness of breath. He did have a bit of a rash on his abdomen and back which is now improved. He decided to take doxycycline 100 mg b.i.d. starting 2 days ago. He feels slightly better since starting the doxycycline. He's had no temperature for the past 2 days. He is exposed to ticks although he has not had an obvious bite recently.

## 2021-09-21 ENCOUNTER — RESULT REVIEW (OUTPATIENT)
Age: 76
End: 2021-09-21

## 2021-09-21 ENCOUNTER — RX RENEWAL (OUTPATIENT)
Age: 76
End: 2021-09-21

## 2021-09-21 ENCOUNTER — APPOINTMENT (OUTPATIENT)
Dept: HEMATOLOGY ONCOLOGY | Facility: CLINIC | Age: 76
End: 2021-09-21
Payer: MEDICARE

## 2021-09-21 VITALS
HEIGHT: 68.98 IN | SYSTOLIC BLOOD PRESSURE: 130 MMHG | RESPIRATION RATE: 16 BRPM | HEART RATE: 54 BPM | DIASTOLIC BLOOD PRESSURE: 76 MMHG | OXYGEN SATURATION: 98 % | WEIGHT: 196.3 LBS | BODY MASS INDEX: 29.07 KG/M2 | TEMPERATURE: 98.3 F

## 2021-09-21 DIAGNOSIS — N41.1 CHRONIC PROSTATITIS: ICD-10-CM

## 2021-09-21 DIAGNOSIS — R97.20 ELEVATED PROSTATE, SPECIFIC ANTIGEN [PSA]: ICD-10-CM

## 2021-09-21 PROCEDURE — 99214 OFFICE O/P EST MOD 30 MIN: CPT

## 2021-09-21 RX ORDER — ZOLPIDEM TARTRATE 10 MG/1
10 TABLET ORAL
Qty: 30 | Refills: 2 | Status: ACTIVE | COMMUNITY
Start: 2021-05-28 | End: 1900-01-01

## 2021-09-26 NOTE — PHYSICAL EXAM
[Fully active, able to carry on all pre-disease performance without restriction] : Status 0 - Fully active, able to carry on all pre-disease performance without restriction [Normal] : affect appropriate [de-identified] : decreased sensation LE and hands

## 2021-09-26 NOTE — HISTORY OF PRESENT ILLNESS
[Disease:__________________________] : Disease: [unfilled] [de-identified] : Patient is a 72 year old male referred by Dr. 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 Labs dated  01/12/2018 show Gamma globulin fraction: 2.2, M spike unable to quantitate but a weak gamma migrating paraprotein identified. Immunofixation identified IgM Kappa band.  Also noted ALVARO 1:160 IFA pattern: speckled.  Patient reports having tingling sensation in hands, feet, arms, legs, and right side face. Patient reports having fatigue.  \par He was diagnosed with CIPD with anti-MAG antibody couple of years ago and has progression. He complains of paresthesias in legs up to knee level and arms.  He feels his balance is affected.  He was treated with IVIG pulse treatment and there was improvement in the reflexes but not with paresthesias.  [de-identified] : Mr. Aguilar presents today for followup for IgM monoclonal gammopathy and neuropathic pain.  Patient still complains about pain in the legs, feet, and face (improved.).  Patient also complains of "coldness" and tingling in the hands. Patient received both vaccination for COVID.

## 2021-09-26 NOTE — ASSESSMENT
[FreeTextEntry1] : 76  active man referred by Dr. 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.\par \par - rituximab 3 rd round - 375mg/m2 x 4 May 2019\par - change to rituximab 1 gm q 2 weeks - Jan 2020, August 2020, April 2021\par - patient still symptomatic with neuropathy, but overall stable\par - IgM decreased, MAG ab negative, but with continued neuropathy, improvement in reflexes.\par - follow up with neurology since had flare up after last rituximab\par - if retreated then 1 gm x 1 q 2 weeks \par - on Viread - continue \par - he does more physical activity now, less fatigue\par - on Lyrica- decreased pain level - stopped taking \par - finished vaccination for COVID Feb 9, 2021\par - evening pain and discomfort in the legs, increase gabapentin to 800 mg at night, 400 mg in am\par - diagnosed with anaplasma - 8/202, s/o doxycycline\par - s/p bactrim x 6 weeks, now with recurrent UTI on Augmentin - BID, started today \par - s/p TURP \par - COVID vaccine done\par - given recent infections delay Rituximab - reevaluate 6-8 weeks, then consider tx \par - insomnia - reviewed with patient sleep hygiene \par \par \par \par

## 2021-09-26 NOTE — REVIEW OF SYSTEMS
[Loss of Hearing] : loss of hearing [Lower Ext Edema] : lower extremity edema [Insomnia] : insomnia [Negative] : Allergic/Immunologic [Fever] : no fever [Fatigue] : no fatigue [Eye Pain] : no eye pain [Recent Change In Weight] : ~T no recent weight change [Vision Problems] : no vision problems [Nosebleeds] : no nosebleeds [Hoarseness] : no hoarseness [Mucosal Pain] : no mucosal pain [Chest Pain] : no chest pain [Palpitations] : no palpitations [Shortness Of Breath] : no shortness of breath [Cough] : no cough [Abdominal Pain] : no abdominal pain [Constipation] : no constipation [Diarrhea] : no diarrhea [Dysuria] : no dysuria [Joint Stiffness] : no joint stiffness [Anxiety] : no anxiety [Depression] : no depression [Hot Flashes] : no hot flashes [FreeTextEntry2] : headaches [FreeTextEntry4] : occurring over several years [FreeTextEntry5] : periodically  [FreeTextEntry8] : prostate issue [de-identified] : progressive paraesthesia in legs hands feel and tingling in right side of face/ neuropathic pain-on Neurontin

## 2021-09-26 NOTE — CONSULT LETTER
[Dear  ___] : Dear  [unfilled], [Consult Letter:] : I had the pleasure of evaluating your patient, [unfilled]. [Consult Closing:] : Thank you very much for allowing me to participate in the care of this patient.  If you have any questions, please do not hesitate to contact me. [Please see my note below.] : Please see my note below. [Sincerely,] : Sincerely, [FreeTextEntry3] : Angy Quijano MD\par [facility] [place] at [facility]\par

## 2021-12-23 ENCOUNTER — APPOINTMENT (OUTPATIENT)
Dept: HEMATOLOGY ONCOLOGY | Facility: CLINIC | Age: 76
End: 2021-12-23
Payer: COMMERCIAL

## 2022-01-06 ENCOUNTER — APPOINTMENT (OUTPATIENT)
Dept: HEMATOLOGY ONCOLOGY | Facility: CLINIC | Age: 77
End: 2022-01-06
Payer: COMMERCIAL

## 2022-01-06 ENCOUNTER — RESULT REVIEW (OUTPATIENT)
Age: 77
End: 2022-01-06

## 2022-01-06 VITALS
BODY MASS INDEX: 29.23 KG/M2 | DIASTOLIC BLOOD PRESSURE: 103 MMHG | WEIGHT: 197.38 LBS | TEMPERATURE: 97.7 F | RESPIRATION RATE: 18 BRPM | OXYGEN SATURATION: 98 % | HEART RATE: 56 BPM | HEIGHT: 68.98 IN | SYSTOLIC BLOOD PRESSURE: 157 MMHG

## 2022-01-06 DIAGNOSIS — R76.8 OTHER SPECIFIED ABNORMAL IMMUNOLOGICAL FINDINGS IN SERUM: ICD-10-CM

## 2022-01-06 PROCEDURE — 36415 COLL VENOUS BLD VENIPUNCTURE: CPT

## 2022-01-06 PROCEDURE — 99214 OFFICE O/P EST MOD 30 MIN: CPT | Mod: 25

## 2022-01-06 NOTE — HISTORY OF PRESENT ILLNESS
[de-identified] : Patient is a 72 year old male referred by Dr. 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 Labs dated  01/12/2018 show Gamma globulin fraction: 2.2, M spike unable to quantitate but a weak gamma migrating paraprotein identified. Immunofixation identified IgM Kappa band.  Also noted ALVARO 1:160 IFA pattern: speckled.  Patient reports having tingling sensation in hands, feet, arms, legs, and right side face. Patient reports having fatigue.  \par He was diagnosed with CIPD with anti-MAG antibody couple of years ago and has progression. He complains of paresthesias in legs up to knee level and arms.  He feels his balance is affected.  He was treated with IVIG pulse treatment and there was improvement in the reflexes but not with paresthesias.  [de-identified] : Mr. Aguilar presents today for followup for IgM monoclonal gammopathy and neuropathic pain.  Patient still complains about pain in the legs, feet, and face (improved.).  Patient also complains of "coldness" and tingling in the hands. Patient received both vaccination for COVID.

## 2022-01-06 NOTE — REVIEW OF SYSTEMS
[Fever] : no fever [Fatigue] : no fatigue [Recent Change In Weight] : ~T no recent weight change [Eye Pain] : no eye pain [Vision Problems] : no vision problems [Nosebleeds] : no nosebleeds [Hoarseness] : no hoarseness [Mucosal Pain] : no mucosal pain [Chest Pain] : no chest pain [Palpitations] : no palpitations [Shortness Of Breath] : no shortness of breath [Cough] : no cough [Abdominal Pain] : no abdominal pain [Constipation] : no constipation [Diarrhea] : no diarrhea [Dysuria] : no dysuria [Joint Stiffness] : no joint stiffness [Anxiety] : no anxiety [Depression] : no depression [Hot Flashes] : no hot flashes [FreeTextEntry2] : headaches [FreeTextEntry4] : occurring over several years [FreeTextEntry5] : periodically  [FreeTextEntry8] : prostate issue [de-identified] : progressive paraesthesia in legs hands feel and tingling in right side of face/ neuropathic pain-on Neurontin

## 2022-01-06 NOTE — ASSESSMENT
[FreeTextEntry1] : 76  active man referred by Dr. 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.\par \par - rituximab 3 rd round - 375mg/m2 x 4 May 2019\par - change to rituximab 1 gm q 2 weeks - Jan 2020, August 2020, April 2021\par - patient still symptomatic with neuropathy, but overall stable\par - IgM decreased, MAG ab negative, but with continued neuropathy, improvement in reflexes.\par - follow up with neurology since had flare up after last rituximab\par - if retreated then 1 gm x 1 q 2 weeks \par - on Viread - continue \par - he does more physical activity now, less fatigue\par - on Lyrica- decreased pain level - stopped taking \par - finished vaccination for COVID Feb 9, 2021, booster September 2021 \par - evening pain and discomfort in the legs, increase gabapentin to 800 mg at night, 400 mg in am\par - diagnosed with anaplasma - 8/2021, s/p doxycycline\par  - s/p TURP \par - COVID vaccine done\par - given recent infections delay Rituximab - reevaluate 6-8 weeks, then consider tx \par - insomnia - reviewed with patient sleep hygiene \par - elevated BP - start Amlodipine 2.5 mg \par - Check HgA1c\par - Schedule Rituximab March 1, day 1, 14 1 gm \par - [place] Macroglobulin - check immunoglobulins \par \par \par \par

## 2022-01-27 RX ORDER — AMLODIPINE BESYLATE 2.5 MG/1
2.5 TABLET ORAL DAILY
Qty: 30 | Refills: 3 | Status: DISCONTINUED | COMMUNITY
Start: 2022-01-06 | End: 2022-01-27

## 2022-04-14 ENCOUNTER — APPOINTMENT (OUTPATIENT)
Dept: INTERNAL MEDICINE | Facility: CLINIC | Age: 77
End: 2022-04-14
Payer: MEDICARE

## 2022-04-14 ENCOUNTER — NON-APPOINTMENT (OUTPATIENT)
Age: 77
End: 2022-04-14

## 2022-04-14 VITALS
SYSTOLIC BLOOD PRESSURE: 150 MMHG | OXYGEN SATURATION: 99 % | HEIGHT: 68 IN | WEIGHT: 196 LBS | TEMPERATURE: 96.8 F | BODY MASS INDEX: 29.7 KG/M2 | DIASTOLIC BLOOD PRESSURE: 95 MMHG | HEART RATE: 65 BPM

## 2022-04-14 DIAGNOSIS — G47.00 INSOMNIA, UNSPECIFIED: ICD-10-CM

## 2022-04-14 DIAGNOSIS — Z00.00 ENCOUNTER FOR GENERAL ADULT MEDICAL EXAMINATION W/OUT ABNORMAL FINDINGS: ICD-10-CM

## 2022-04-14 DIAGNOSIS — I10 ESSENTIAL (PRIMARY) HYPERTENSION: ICD-10-CM

## 2022-04-14 PROCEDURE — 99397 PER PM REEVAL EST PAT 65+ YR: CPT | Mod: 25

## 2022-04-14 PROCEDURE — 36415 COLL VENOUS BLD VENIPUNCTURE: CPT

## 2022-04-14 PROCEDURE — 93000 ELECTROCARDIOGRAM COMPLETE: CPT

## 2022-04-14 NOTE — PHYSICAL EXAM
[No Acute Distress] : no acute distress [Well Nourished] : well nourished [Well Developed] : well developed [Well-Appearing] : well-appearing [Normal Sclera/Conjunctiva] : normal sclera/conjunctiva [PERRL] : pupils equal round and reactive to light [EOMI] : extraocular movements intact [Normal Outer Ear/Nose] : the outer ears and nose were normal in appearance [Normal Oropharynx] : the oropharynx was normal [No JVD] : no jugular venous distention [No Lymphadenopathy] : no lymphadenopathy [Supple] : supple [Thyroid Normal, No Nodules] : the thyroid was normal and there were no nodules present [No Respiratory Distress] : no respiratory distress  [No Accessory Muscle Use] : no accessory muscle use [Clear to Auscultation] : lungs were clear to auscultation bilaterally [Normal Rate] : normal rate  [Regular Rhythm] : with a regular rhythm [Normal S1, S2] : normal S1 and S2 [No Carotid Bruits] : no carotid bruits [No Abdominal Bruit] : a ~M bruit was not heard ~T in the abdomen [No Varicosities] : no varicosities [Pedal Pulses Present] : the pedal pulses are present [No Edema] : there was no peripheral edema [No Palpable Aorta] : no palpable aorta [No Extremity Clubbing/Cyanosis] : no extremity clubbing/cyanosis [Soft] : abdomen soft [Non Tender] : non-tender [Non-distended] : non-distended [No Masses] : no abdominal mass palpated [No HSM] : no HSM [Normal Bowel Sounds] : normal bowel sounds [Normal Posterior Cervical Nodes] : no posterior cervical lymphadenopathy [Normal Anterior Cervical Nodes] : no anterior cervical lymphadenopathy [No CVA Tenderness] : no CVA  tenderness [No Spinal Tenderness] : no spinal tenderness [No Joint Swelling] : no joint swelling [Grossly Normal Strength/Tone] : grossly normal strength/tone [No Rash] : no rash [Coordination Grossly Intact] : coordination grossly intact [No Focal Deficits] : no focal deficits [Normal Gait] : normal gait [Deep Tendon Reflexes (DTR)] : deep tendon reflexes were 2+ and symmetric [Normal Affect] : the affect was normal [Normal Insight/Judgement] : insight and judgment were intact [de-identified] : 1/6 susanne at [facility]

## 2022-04-14 NOTE — HISTORY OF PRESENT ILLNESS
[FreeTextEntry1] : Yearly physical exam [de-identified] : This is a 76 her old male with a history of monoclonal gammopathy of unknown significance, history of CIDP followed by neurology and hematology. His neurologic condition is generally stable with paresthesias in the lower extremities. He recently was placed on amlodipine 5 mg daily for hypertension. He also takes diclofenac 75 mg at bedtime for osteoarthritis of the left shoulder. Patient remains quite active hiking and biking. He has benign essential tremor. He has chronic insomnia for which she takes zolpidem 5 mg about 4 times weekly. The past few weeks he notices an occasional fluttering in the throat region. It is very intermittent. He also has frequent clearing of his throat with attempts to speak. He has not had ENT followup. His last eye exam was 2 years ago and his last colonoscopy was 2 years ago. He had urologic surgery for an enlarged prostate about a year and a half ago. He now has absolutely no urinary complaints. His current blood pressure 130/80.

## 2022-04-14 NOTE — ASSESSMENT
[FreeTextEntry1] : Patient with CIDP clinically stable on current regimen. He is followed by hematology for mgus. Blood pressure is currently 130/85 and I would continue amlodipine. I will renew so for them for insomnia. Routine labs and EKG have been done. EKG reveals sinus bradycardia with occasional APCs. Patient is reassured. He will call me for lab results.

## 2022-04-22 LAB
25(OH)D3 SERPL-MCNC: 33.8 NG/ML
ALBUMIN SERPL ELPH-MCNC: 4.4 G/DL
ALP BLD-CCNC: 69 U/L
ALT SERPL-CCNC: 23 U/L
ANION GAP SERPL CALC-SCNC: 12 MMOL/L
AST SERPL-CCNC: 34 U/L
BASOPHILS # BLD AUTO: 0.09 K/UL
BASOPHILS NFR BLD AUTO: 1.7 %
BILIRUB SERPL-MCNC: 0.7 MG/DL
BUN SERPL-MCNC: 26 MG/DL
CALCIUM SERPL-MCNC: 9.3 MG/DL
CHLORIDE SERPL-SCNC: 104 MMOL/L
CHOLEST SERPL-MCNC: 200 MG/DL
CO2 SERPL-SCNC: 26 MMOL/L
CREAT SERPL-MCNC: 0.95 MG/DL
EGFR: 83 ML/MIN/1.73M2
EOSINOPHIL # BLD AUTO: 0.18 K/UL
EOSINOPHIL NFR BLD AUTO: 3.4 %
ESTIMATED AVERAGE GLUCOSE: 120 MG/DL
GLUCOSE SERPL-MCNC: 86 MG/DL
HBA1C MFR BLD HPLC: 5.8 %
HCT VFR BLD CALC: 46.1 %
HDLC SERPL-MCNC: 79 MG/DL
HGB BLD-MCNC: 14.7 G/DL
IMM GRANULOCYTES NFR BLD AUTO: 0.2 %
LDLC SERPL CALC-MCNC: 109 MG/DL
LYMPHOCYTES # BLD AUTO: 0.9 K/UL
LYMPHOCYTES NFR BLD AUTO: 16.8 %
MAN DIFF?: NORMAL
MCHC RBC-ENTMCNC: 29.6 PG
MCHC RBC-ENTMCNC: 31.9 GM/DL
MCV RBC AUTO: 92.9 FL
MONOCYTES # BLD AUTO: 0.76 K/UL
MONOCYTES NFR BLD AUTO: 14.2 %
NEUTROPHILS # BLD AUTO: 3.43 K/UL
NEUTROPHILS NFR BLD AUTO: 63.7 %
NONHDLC SERPL-MCNC: 121 MG/DL
PLATELET # BLD AUTO: 250 K/UL
POTASSIUM SERPL-SCNC: 4.6 MMOL/L
PROT SERPL-MCNC: 6.4 G/DL
PSA SERPL-MCNC: 0.57 NG/ML
RBC # BLD: 4.96 M/UL
RBC # FLD: 14.6 %
SODIUM SERPL-SCNC: 143 MMOL/L
TRIGL SERPL-MCNC: 60 MG/DL
TSH SERPL-ACNC: 1.73 UIU/ML
WBC # FLD AUTO: 5.37 K/UL

## 2022-04-25 ENCOUNTER — APPOINTMENT (OUTPATIENT)
Dept: INTERNAL MEDICINE | Facility: CLINIC | Age: 77
End: 2022-04-25

## 2022-06-09 ENCOUNTER — APPOINTMENT (OUTPATIENT)
Dept: HEMATOLOGY ONCOLOGY | Facility: CLINIC | Age: 77
End: 2022-06-09

## 2022-06-22 ENCOUNTER — RX RENEWAL (OUTPATIENT)
Age: 77
End: 2022-06-22

## 2022-07-07 LAB — G LAMBLIA AG STL QL: NORMAL

## 2022-07-08 LAB — DEPRECATED O AND P PREP STL: NORMAL

## 2022-07-11 LAB
BACTERIA STL CULT: NORMAL
E HISTOLYT AG STL IA-ACNC: NOT DETECTED

## 2022-07-26 ENCOUNTER — APPOINTMENT (OUTPATIENT)
Dept: HEMATOLOGY ONCOLOGY | Facility: CLINIC | Age: 77
End: 2022-07-26

## 2022-08-03 ENCOUNTER — NON-APPOINTMENT (OUTPATIENT)
Age: 77
End: 2022-08-03

## 2022-08-03 ENCOUNTER — APPOINTMENT (OUTPATIENT)
Dept: INTERNAL MEDICINE | Facility: CLINIC | Age: 77
End: 2022-08-03

## 2022-08-03 VITALS
HEART RATE: 71 BPM | BODY MASS INDEX: 29.86 KG/M2 | OXYGEN SATURATION: 98 % | HEIGHT: 68 IN | DIASTOLIC BLOOD PRESSURE: 80 MMHG | SYSTOLIC BLOOD PRESSURE: 120 MMHG | TEMPERATURE: 96.7 F | WEIGHT: 197 LBS

## 2022-08-03 DIAGNOSIS — Z01.818 ENCOUNTER FOR OTHER PREPROCEDURAL EXAMINATION: ICD-10-CM

## 2022-08-03 PROCEDURE — 93000 ELECTROCARDIOGRAM COMPLETE: CPT

## 2022-08-03 PROCEDURE — 99214 OFFICE O/P EST MOD 30 MIN: CPT | Mod: 25

## 2022-08-03 PROCEDURE — 36415 COLL VENOUS BLD VENIPUNCTURE: CPT

## 2022-08-03 NOTE — PHYSICAL EXAM
[No Acute Distress] : no acute distress [Well Nourished] : well nourished [Well Developed] : well developed [Well-Appearing] : well-appearing [Normal Sclera/Conjunctiva] : normal sclera/conjunctiva [PERRL] : pupils equal round and reactive to light [EOMI] : extraocular movements intact [Normal Outer Ear/Nose] : the outer ears and nose were normal in appearance [Normal Oropharynx] : the oropharynx was normal [No JVD] : no jugular venous distention [No Lymphadenopathy] : no lymphadenopathy [Supple] : supple [Thyroid Normal, No Nodules] : the thyroid was normal and there were no nodules present [No Respiratory Distress] : no respiratory distress  [No Accessory Muscle Use] : no accessory muscle use [Clear to Auscultation] : lungs were clear to auscultation bilaterally [Normal Rate] : normal rate  [Regular Rhythm] : with a regular rhythm [Normal S1, S2] : normal S1 and S2 [No Carotid Bruits] : no carotid bruits [No Abdominal Bruit] : a ~M bruit was not heard ~T in the abdomen [No Varicosities] : no varicosities [Pedal Pulses Present] : the pedal pulses are present [No Edema] : there was no peripheral edema [No Palpable Aorta] : no palpable aorta [No Extremity Clubbing/Cyanosis] : no extremity clubbing/cyanosis [Soft] : abdomen soft [Non Tender] : non-tender [Non-distended] : non-distended [No Masses] : no abdominal mass palpated [No HSM] : no HSM [Normal Bowel Sounds] : normal bowel sounds [Normal Posterior Cervical Nodes] : no posterior cervical lymphadenopathy [Normal Anterior Cervical Nodes] : no anterior cervical lymphadenopathy [Grossly Normal Strength/Tone] : grossly normal strength/tone [No Focal Deficits] : no focal deficits [Normal Gait] : normal gait [Deep Tendon Reflexes (DTR)] : deep tendon reflexes were 2+ and symmetric [Normal Affect] : the affect was normal [Normal Insight/Judgement] : insight and judgment were intact [de-identified] : 1/6 susanne at [facility]

## 2022-08-03 NOTE — PLAN
[FreeTextEntry1] : Patient medically cleared to proceed with left total shoulder replacement. 65580 Exp Problem Focused - Mod. Complex

## 2022-08-03 NOTE — ASSESSMENT
[FreeTextEntry1] : EKG reveals normal sinus rhythm with frequent APCs otherwise normal. Labs are pending. I feel this patient is medically stable to proceed with planned left total shoulder replacement.

## 2022-08-03 NOTE — HISTORY OF PRESENT ILLNESS
[FreeTextEntry1] : Preoperative clearance. [de-identified] : Patient is scheduled for a left total shoulder replacement on August 16. This is a 77-year-old male with a history of monoclonal gammopathy of unknown significance, history of CIDP, history of hepatitis B surface antigen positivity and history of mild hypertension. He is treated with Rituxan every 6 months for CIDP. Past surgery left shoulder surgery 16 years ago, history of laminectomy 12 years ago and appendectomy at age 19. Current medications tenofovir, , gabapentin and amlodipine. Allergies none. Social history nonsmoker nondrinker. Review of systems he has no chest pain or shortness of breath. He is very active biking and hiking. He is known to have a cardiac murmur for many many years.

## 2022-08-12 LAB
ALBUMIN SERPL ELPH-MCNC: 4.5 G/DL
ALP BLD-CCNC: 85 U/L
ALT SERPL-CCNC: 14 U/L
ANION GAP SERPL CALC-SCNC: 14 MMOL/L
AST SERPL-CCNC: 21 U/L
BASOPHILS # BLD AUTO: 0.03 K/UL
BASOPHILS NFR BLD AUTO: 0.4 %
BILIRUB SERPL-MCNC: 0.4 MG/DL
BUN SERPL-MCNC: 31 MG/DL
CALCIUM SERPL-MCNC: 9.7 MG/DL
CHLORIDE SERPL-SCNC: 103 MMOL/L
CO2 SERPL-SCNC: 22 MMOL/L
CREAT SERPL-MCNC: 1.06 MG/DL
EGFR: 72 ML/MIN/1.73M2
EOSINOPHIL # BLD AUTO: 0.12 K/UL
EOSINOPHIL NFR BLD AUTO: 1.7 %
GLUCOSE SERPL-MCNC: 92 MG/DL
HCT VFR BLD CALC: 45.9 %
HGB BLD-MCNC: 15 G/DL
IMM GRANULOCYTES NFR BLD AUTO: 0.3 %
LYMPHOCYTES # BLD AUTO: 1.05 K/UL
LYMPHOCYTES NFR BLD AUTO: 14.9 %
MAN DIFF?: NORMAL
MCHC RBC-ENTMCNC: 30.4 PG
MCHC RBC-ENTMCNC: 32.7 GM/DL
MCV RBC AUTO: 92.9 FL
MONOCYTES # BLD AUTO: 0.73 K/UL
MONOCYTES NFR BLD AUTO: 10.3 %
NEUTROPHILS # BLD AUTO: 5.12 K/UL
NEUTROPHILS NFR BLD AUTO: 72.4 %
PLATELET # BLD AUTO: 297 K/UL
POTASSIUM SERPL-SCNC: 4.9 MMOL/L
PROT SERPL-MCNC: 6.5 G/DL
RBC # BLD: 4.94 M/UL
RBC # FLD: 14 %
SODIUM SERPL-SCNC: 138 MMOL/L
WBC # FLD AUTO: 7.07 K/UL

## 2022-08-25 ENCOUNTER — RX RENEWAL (OUTPATIENT)
Age: 77
End: 2022-08-25

## 2022-08-26 ENCOUNTER — LABORATORY RESULT (OUTPATIENT)
Age: 77
End: 2022-08-26

## 2022-08-26 ENCOUNTER — APPOINTMENT (OUTPATIENT)
Dept: INTERNAL MEDICINE | Facility: CLINIC | Age: 77
End: 2022-08-26

## 2022-08-26 VITALS
TEMPERATURE: 96.8 F | SYSTOLIC BLOOD PRESSURE: 110 MMHG | HEIGHT: 68 IN | BODY MASS INDEX: 29.86 KG/M2 | WEIGHT: 197 LBS | DIASTOLIC BLOOD PRESSURE: 80 MMHG | OXYGEN SATURATION: 98 % | HEART RATE: 68 BPM

## 2022-08-26 DIAGNOSIS — R50.9 FEVER, UNSPECIFIED: ICD-10-CM

## 2022-08-26 PROCEDURE — 36415 COLL VENOUS BLD VENIPUNCTURE: CPT

## 2022-08-26 PROCEDURE — 99214 OFFICE O/P EST MOD 30 MIN: CPT | Mod: 25

## 2022-08-26 NOTE — ASSESSMENT
[FreeTextEntry1] : The etiology of patient's complaints of chills, sweats and headache is unclear. This may be related to his covid infection. I doubt this is related to a tick borne illness. We'll check routine labs including tick borne illness. Patient is to call Monday for results.

## 2022-08-26 NOTE — PHYSICAL EXAM
[No Acute Distress] : no acute distress [Well Nourished] : well nourished [Well Developed] : well developed [Well-Appearing] : well-appearing [Normal Sclera/Conjunctiva] : normal sclera/conjunctiva [PERRL] : pupils equal round and reactive to light [EOMI] : extraocular movements intact [Normal Outer Ear/Nose] : the outer ears and nose were normal in appearance [Normal Oropharynx] : the oropharynx was normal [No JVD] : no jugular venous distention [No Lymphadenopathy] : no lymphadenopathy [Supple] : supple [Thyroid Normal, No Nodules] : the thyroid was normal and there were no nodules present [No Respiratory Distress] : no respiratory distress  [No Accessory Muscle Use] : no accessory muscle use [Clear to Auscultation] : lungs were clear to auscultation bilaterally [Normal Rate] : normal rate  [Regular Rhythm] : with a regular rhythm [Normal S1, S2] : normal S1 and S2 [No Carotid Bruits] : no carotid bruits [No Abdominal Bruit] : a ~M bruit was not heard ~T in the abdomen [No Varicosities] : no varicosities [Pedal Pulses Present] : the pedal pulses are present [No Edema] : there was no peripheral edema [No Palpable Aorta] : no palpable aorta [No Extremity Clubbing/Cyanosis] : no extremity clubbing/cyanosis [Soft] : abdomen soft [Non Tender] : non-tender [Non-distended] : non-distended [No Masses] : no abdominal mass palpated [No HSM] : no HSM [Normal Bowel Sounds] : normal bowel sounds [Normal Posterior Cervical Nodes] : no posterior cervical lymphadenopathy [Normal Anterior Cervical Nodes] : no anterior cervical lymphadenopathy [Grossly Normal Strength/Tone] : grossly normal strength/tone [No Focal Deficits] : no focal deficits [Normal Gait] : normal gait [Deep Tendon Reflexes (DTR)] : deep tendon reflexes were 2+ and symmetric [Normal Affect] : the affect was normal [Normal Insight/Judgement] : insight and judgment were intact [de-identified] : 1/6 susanne at [facility]

## 2022-08-26 NOTE — HISTORY OF PRESENT ILLNESS
[FreeTextEntry1] : Headaches, chills and sweats [de-identified] : Patient had covid on July 24. A repeat test on August 10 was positive. He was treated with Paxlovid, He has persistent symptoms of sinus pressure over the eyes with slight sweats and chills. No documented fever. He has some minor headache. He does not feel fatigue. His appetite is normal. He's been taking his temperature but it's been normal. This patient is on Rituxan every 6 months for CIDP. [place] and has had tick borne illness last year.

## 2022-08-30 LAB
ALBUMIN SERPL ELPH-MCNC: 4.4 G/DL
ALP BLD-CCNC: 82 U/L
ALT SERPL-CCNC: 12 U/L
ANION GAP SERPL CALC-SCNC: 12 MMOL/L
AST SERPL-CCNC: 25 U/L
BASOPHILS # BLD AUTO: 0.05 K/UL
BASOPHILS NFR BLD AUTO: 0.8 %
BILIRUB SERPL-MCNC: 0.4 MG/DL
BUN SERPL-MCNC: 29 MG/DL
CALCIUM SERPL-MCNC: 9.9 MG/DL
CHLORIDE SERPL-SCNC: 103 MMOL/L
CO2 SERPL-SCNC: 25 MMOL/L
CREAT SERPL-MCNC: 1.16 MG/DL
CRP SERPL-MCNC: <3 MG/L
EGFR: 65 ML/MIN/1.73M2
EOSINOPHIL # BLD AUTO: 0.14 K/UL
EOSINOPHIL NFR BLD AUTO: 2.1 %
ERYTHROCYTE [SEDIMENTATION RATE] IN BLOOD BY WESTERGREN METHOD: 11 MM/HR
GLUCOSE SERPL-MCNC: 99 MG/DL
HCT VFR BLD CALC: 41 %
HGB BLD-MCNC: 13.9 G/DL
IMM GRANULOCYTES NFR BLD AUTO: 0.3 %
LYMPHOCYTES # BLD AUTO: 1.03 K/UL
LYMPHOCYTES NFR BLD AUTO: 15.8 %
MAN DIFF?: NORMAL
MCHC RBC-ENTMCNC: 30.2 PG
MCHC RBC-ENTMCNC: 33.9 GM/DL
MCV RBC AUTO: 89.1 FL
MONOCYTES # BLD AUTO: 0.85 K/UL
MONOCYTES NFR BLD AUTO: 13 %
NEUTROPHILS # BLD AUTO: 4.44 K/UL
NEUTROPHILS NFR BLD AUTO: 68 %
PLATELET # BLD AUTO: 233 K/UL
POTASSIUM SERPL-SCNC: 4.8 MMOL/L
PROT SERPL-MCNC: 6.3 G/DL
RBC # BLD: 4.6 M/UL
RBC # FLD: 13.9 %
SODIUM SERPL-SCNC: 140 MMOL/L
WBC # FLD AUTO: 6.53 K/UL

## 2022-08-31 LAB
A PHAGOCYTOPH IGG TITR SER IF: ABNORMAL TITER
B BURGDOR AB SER QL IA: NORMAL
B MICROTI IGG TITR SER: NORMAL TITER
E CHAFFEENSIS IGG TITR SER IF: NORMAL TITER

## 2022-09-23 ENCOUNTER — APPOINTMENT (OUTPATIENT)
Dept: INTERNAL MEDICINE | Facility: CLINIC | Age: 77
End: 2022-09-23

## 2022-09-23 VITALS
SYSTOLIC BLOOD PRESSURE: 126 MMHG | WEIGHT: 190 LBS | HEIGHT: 68 IN | OXYGEN SATURATION: 98 % | DIASTOLIC BLOOD PRESSURE: 90 MMHG | HEART RATE: 57 BPM | BODY MASS INDEX: 28.79 KG/M2 | TEMPERATURE: 96.6 F

## 2022-09-23 DIAGNOSIS — A69.20 LYME DISEASE, UNSPECIFIED: ICD-10-CM

## 2022-09-23 PROCEDURE — 99214 OFFICE O/P EST MOD 30 MIN: CPT

## 2022-09-23 NOTE — PHYSICAL EXAM
[No Acute Distress] : no acute distress [Well Nourished] : well nourished [Well Developed] : well developed [Well-Appearing] : well-appearing [Normal Sclera/Conjunctiva] : normal sclera/conjunctiva [PERRL] : pupils equal round and reactive to light [EOMI] : extraocular movements intact [Normal Outer Ear/Nose] : the outer ears and nose were normal in appearance [Normal Oropharynx] : the oropharynx was normal [No JVD] : no jugular venous distention [No Lymphadenopathy] : no lymphadenopathy [Supple] : supple [Thyroid Normal, No Nodules] : the thyroid was normal and there were no nodules present [No Respiratory Distress] : no respiratory distress  [No Accessory Muscle Use] : no accessory muscle use [Clear to Auscultation] : lungs were clear to auscultation bilaterally [Normal Rate] : normal rate  [Regular Rhythm] : with a regular rhythm [Normal S1, S2] : normal S1 and S2 [No Carotid Bruits] : no carotid bruits [No Abdominal Bruit] : a ~M bruit was not heard ~T in the abdomen [No Varicosities] : no varicosities [Pedal Pulses Present] : the pedal pulses are present [No Edema] : there was no peripheral edema [No Palpable Aorta] : no palpable aorta [No Extremity Clubbing/Cyanosis] : no extremity clubbing/cyanosis [Soft] : abdomen soft [Non Tender] : non-tender [Non-distended] : non-distended [No Masses] : no abdominal mass palpated [No HSM] : no HSM [Normal Bowel Sounds] : normal bowel sounds [Normal Posterior Cervical Nodes] : no posterior cervical lymphadenopathy [Normal Anterior Cervical Nodes] : no anterior cervical lymphadenopathy [Grossly Normal Strength/Tone] : grossly normal strength/tone [No Focal Deficits] : no focal deficits [Normal Gait] : normal gait [Deep Tendon Reflexes (DTR)] : deep tendon reflexes were 2+ and symmetric [Normal Affect] : the affect was normal [Normal Insight/Judgement] : insight and judgment were intact [de-identified] : 1/6 susanne at [facility]

## 2022-09-23 NOTE — HISTORY OF PRESENT ILLNESS
[FreeTextEntry1] : Frontal headache with sinus pressure [de-identified] : Patient was treated in mid August with doxycycline for a positive Lyme test he was feeling much better. His headache resolved and his energy level improved. He actually underwent shoulder surgery on September 6. He was feeling well up until about 4-5 days ago when again he started feeling frontal headaches with sinus pressure and ear pressure. He denies nasal drip or rhinorrhea. He denies cough, fever or chills. He denies muscle aches. He feels slightly fatigued. He remains quite active hiking.

## 2022-09-23 NOTE — ASSESSMENT
[FreeTextEntry1] : The etiology of patient's frontal headaches and sinus pressure is unclear. Patient is taking nasalcort. I doubt whether it be known with sinus disease. I will treat with doxycycline for a week if the symptoms are related to his prior Lyme disease. I cannot rule out symptoms do to long covid. Patient will call me in about a week or 10 days and let me know how he is doing.

## 2022-10-02 ENCOUNTER — RX RENEWAL (OUTPATIENT)
Age: 77
End: 2022-10-02

## 2023-01-05 ENCOUNTER — RESULT REVIEW (OUTPATIENT)
Age: 78
End: 2023-01-05

## 2023-01-05 ENCOUNTER — APPOINTMENT (OUTPATIENT)
Dept: HEMATOLOGY ONCOLOGY | Facility: CLINIC | Age: 78
End: 2023-01-05
Payer: MEDICARE

## 2023-01-05 VITALS
BODY MASS INDEX: 28.54 KG/M2 | HEART RATE: 56 BPM | SYSTOLIC BLOOD PRESSURE: 168 MMHG | HEIGHT: 70 IN | WEIGHT: 199.38 LBS | RESPIRATION RATE: 17 BRPM | TEMPERATURE: 98 F | OXYGEN SATURATION: 99 % | DIASTOLIC BLOOD PRESSURE: 95 MMHG

## 2023-01-05 DIAGNOSIS — U07.1 COVID-19: ICD-10-CM

## 2023-01-05 PROCEDURE — 36415 COLL VENOUS BLD VENIPUNCTURE: CPT

## 2023-01-05 PROCEDURE — 99214 OFFICE O/P EST MOD 30 MIN: CPT | Mod: 25

## 2023-01-05 NOTE — REVIEW OF SYSTEMS
[Loss of Hearing] : loss of hearing [Lower Ext Edema] : lower extremity edema [Insomnia] : insomnia [Negative] : Allergic/Immunologic [Fever] : no fever [Fatigue] : no fatigue [Recent Change In Weight] : ~T no recent weight change [Eye Pain] : no eye pain [Vision Problems] : no vision problems [Nosebleeds] : no nosebleeds [Hoarseness] : no hoarseness [Mucosal Pain] : no mucosal pain [Chest Pain] : no chest pain [Palpitations] : no palpitations [Shortness Of Breath] : no shortness of breath [Cough] : no cough [Abdominal Pain] : no abdominal pain [Constipation] : no constipation [Diarrhea] : no diarrhea [Dysuria] : no dysuria [Joint Stiffness] : no joint stiffness [Anxiety] : no anxiety [Depression] : no depression [Hot Flashes] : no hot flashes [FreeTextEntry2] : headaches [FreeTextEntry4] : occurring over several years [FreeTextEntry5] : periodically  [FreeTextEntry8] : prostate issue [de-identified] : progressive paraesthesia in legs hands feel and tingling in right side of face/ neuropathic pain-on Neurontin

## 2023-01-05 NOTE — HISTORY OF PRESENT ILLNESS
[Disease:__________________________] : Disease: [unfilled] [de-identified] : Patient is a 72 year old male referred by Dr. 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 Labs dated  01/12/2018 show Gamma globulin fraction: 2.2, M spike unable to quantitate but a weak gamma migrating paraprotein identified. Immunofixation identified IgM Kappa band.  Also noted ALVARO 1:160 IFA pattern: speckled.  Patient reports having tingling sensation in hands, feet, arms, legs, and right side face. Patient reports having fatigue.  \par He was diagnosed with CIPD with anti-MAG antibody couple of years ago and has progression. He complains of paresthesias in legs up to knee level and arms.  He feels his balance is affected.  He was treated with IVIG pulse treatment and there was improvement in the reflexes but not with paresthesias.  [de-identified] : Mr. Aguilar presents today for followup for IgM monoclonal gammopathy and neuropathic pain.  Patient still complains about pain in the legs, feet, and face (improved.).  Patient also complains of "coldness" and tingling in the hands. Patient received both vaccination for COVID.

## 2023-01-05 NOTE — ASSESSMENT
[FreeTextEntry1] : 77 year old  active man referred by Dr. 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.\par \par - rituximab 3 rd round - 375mg/m2 x 4 May 2019\par - change to rituximab 1 gm q 2 weeks - Jan 2020, August 2020, April 2021\par - patient still symptomatic with neuropathy, but overall stable\par - IgM decreased, MAG ab negative, but with continued neuropathy, improvement in reflexes.\par - follow up with neurology since had flare up after last rituximab\par - if retreated then 1 gm x 1 q 2 weeks \par - on Viread - continue \par - he does more physical activity now, less fatigue\par - on Lyrica- decreased pain level - stopped taking \par - finished vaccination for COVID Feb 9, 2021, booster September 2021 \par - evening pain and discomfort in the legs, increase gabapentin to 800 mg at night, 400 mg in am\par - diagnosed with anaplasma - 8/2021, s/p doxycycline\par - COVID vaccine done\par - given recent infections delay Rituximab - reevaluate 6-8 weeks, then consider tx \par \par #  insomnia - reviewed with patient sleep hygiene \par \par # HTN  elevated BP - start Amlodipine 2.5 mg \par \par # DM Check HgA1c\par \par # Covid - July 2022, prolonged recovery\par \par # Sinusitis - ear pressure, congestion, seen by ENT.  Dr. Rose, to see Dr. White\par \par # BPH - s/o TURP \par

## 2023-01-05 NOTE — PHYSICAL EXAM
[Fully active, able to carry on all pre-disease performance without restriction] : Status 0 - Fully active, able to carry on all pre-disease performance without restriction [Normal] : affect appropriate [de-identified] : decreased sensation LE and hands

## 2023-01-05 NOTE — REVIEW OF SYSTEMS
[Loss of Hearing] : loss of hearing [Lower Ext Edema] : lower extremity edema [Insomnia] : insomnia [Negative] : Allergic/Immunologic [Fever] : no fever [Fatigue] : no fatigue [Recent Change In Weight] : ~T no recent weight change [Eye Pain] : no eye pain [Vision Problems] : no vision problems [Nosebleeds] : no nosebleeds [Hoarseness] : no hoarseness [Mucosal Pain] : no mucosal pain [Chest Pain] : no chest pain [Palpitations] : no palpitations [Shortness Of Breath] : no shortness of breath [Cough] : no cough [Abdominal Pain] : no abdominal pain [Constipation] : no constipation [Diarrhea] : no diarrhea [Dysuria] : no dysuria [Joint Stiffness] : no joint stiffness [Anxiety] : no anxiety [Depression] : no depression [Hot Flashes] : no hot flashes [FreeTextEntry2] : headaches [FreeTextEntry4] : occurring over several years [FreeTextEntry5] : periodically  [FreeTextEntry8] : prostate issue [de-identified] : progressive paraesthesia in legs hands feel and tingling in right side of face/ neuropathic pain-on Neurontin

## 2023-01-05 NOTE — CONSULT LETTER
[Dear  ___] : Dear  [unfilled], [Consult Letter:] : I had the pleasure of evaluating your patient, [unfilled]. [Please see my note below.] : Please see my note below. [Consult Closing:] : Thank you very much for allowing me to participate in the care of this patient.  If you have any questions, please do not hesitate to contact me. [Sincerely,] : Sincerely, [FreeTextEntry3] : Angy Quijano MD\par [facility] [place] at [facility]\par

## 2023-01-05 NOTE — HISTORY OF PRESENT ILLNESS
[Disease:__________________________] : Disease: [unfilled] [de-identified] : Patient is a 72 year old male referred by Dr. 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 Labs dated  01/12/2018 show Gamma globulin fraction: 2.2, M spike unable to quantitate but a weak gamma migrating paraprotein identified. Immunofixation identified IgM Kappa band.  Also noted ALVARO 1:160 IFA pattern: speckled.  Patient reports having tingling sensation in hands, feet, arms, legs, and right side face. Patient reports having fatigue.  \par He was diagnosed with CIPD with anti-MAG antibody couple of years ago and has progression. He complains of paresthesias in legs up to knee level and arms.  He feels his balance is affected.  He was treated with IVIG pulse treatment and there was improvement in the reflexes but not with paresthesias.  [de-identified] : Mr. Aguilar presents today for followup for IgM monoclonal gammopathy and neuropathic pain.  Patient still complains about pain in the legs, feet, and face (improved.).  Patient also complains of "coldness" and tingling in the hands. Patient received both vaccination for COVID.

## 2023-01-05 NOTE — PHYSICAL EXAM
[Fully active, able to carry on all pre-disease performance without restriction] : Status 0 - Fully active, able to carry on all pre-disease performance without restriction [Normal] : affect appropriate [de-identified] : decreased sensation LE and hands

## 2023-01-05 NOTE — CONSULT LETTER
[Dear  ___] : Dear  [unfilled], [Consult Letter:] : I had the pleasure of evaluating your patient, [unfilled]. [Please see my note below.] : Please see my note below. [Consult Closing:] : Thank you very much for allowing me to participate in the care of this patient.  If you have any questions, please do not hesitate to contact me. [Sincerely,] : Sincerely, [FreeTextEntry3] : Angy Quijano MD\par [facility] [place] at [facility]\par

## 2023-03-10 ENCOUNTER — TRANSCRIPTION ENCOUNTER (OUTPATIENT)
Age: 78
End: 2023-03-10

## 2023-03-16 ENCOUNTER — APPOINTMENT (OUTPATIENT)
Dept: CARDIOLOGY | Facility: CLINIC | Age: 78
End: 2023-03-16

## 2023-04-08 ENCOUNTER — RX RENEWAL (OUTPATIENT)
Age: 78
End: 2023-04-08

## 2023-05-04 ENCOUNTER — APPOINTMENT (OUTPATIENT)
Dept: GASTROENTEROLOGY | Facility: CLINIC | Age: 78
End: 2023-05-04

## 2023-05-11 ENCOUNTER — RESULT REVIEW (OUTPATIENT)
Age: 78
End: 2023-05-11

## 2023-05-11 ENCOUNTER — APPOINTMENT (OUTPATIENT)
Dept: HEMATOLOGY ONCOLOGY | Facility: CLINIC | Age: 78
End: 2023-05-11
Payer: MEDICARE

## 2023-05-11 VITALS
WEIGHT: 200.44 LBS | HEIGHT: 70 IN | BODY MASS INDEX: 28.7 KG/M2 | TEMPERATURE: 97 F | RESPIRATION RATE: 16 BRPM | SYSTOLIC BLOOD PRESSURE: 144 MMHG | HEART RATE: 45 BPM | DIASTOLIC BLOOD PRESSURE: 83 MMHG | OXYGEN SATURATION: 99 %

## 2023-05-11 PROCEDURE — 36415 COLL VENOUS BLD VENIPUNCTURE: CPT

## 2023-05-11 PROCEDURE — 99214 OFFICE O/P EST MOD 30 MIN: CPT | Mod: 25

## 2023-05-11 RX ORDER — ZOLPIDEM TARTRATE 10 MG/1
10 TABLET ORAL
Qty: 30 | Refills: 2 | Status: COMPLETED | COMMUNITY
Start: 2022-10-04 | End: 2023-05-11

## 2023-05-11 RX ORDER — APIXABAN 5 MG/1
5 TABLET, FILM COATED ORAL
Refills: 0 | Status: ACTIVE | COMMUNITY

## 2023-05-11 RX ORDER — DOXYCYCLINE HYCLATE 100 MG/1
100 CAPSULE ORAL TWICE DAILY
Qty: 28 | Refills: 0 | Status: COMPLETED | COMMUNITY
Start: 2021-08-23 | End: 2023-05-11

## 2023-05-11 RX ORDER — DOXYCYCLINE HYCLATE 100 MG/1
100 CAPSULE ORAL TWICE DAILY
Qty: 28 | Refills: 0 | Status: COMPLETED | COMMUNITY
Start: 2022-08-30 | End: 2023-05-11

## 2023-05-11 RX ORDER — NIRMATRELVIR AND RITONAVIR 300-100 MG
20 X 150 MG & KIT ORAL
Qty: 30 | Refills: 0 | Status: COMPLETED | COMMUNITY
Start: 2022-07-25 | End: 2023-05-11

## 2023-05-11 RX ORDER — DOXYCYCLINE HYCLATE 100 MG/1
100 CAPSULE ORAL TWICE DAILY
Qty: 28 | Refills: 0 | Status: COMPLETED | COMMUNITY
Start: 2022-09-23 | End: 2023-05-11

## 2023-05-11 RX ORDER — AMLODIPINE BESYLATE 5 MG/1
5 TABLET ORAL DAILY
Qty: 90 | Refills: 3 | Status: COMPLETED | COMMUNITY
Start: 2022-01-27 | End: 2023-05-11

## 2023-05-11 RX ORDER — ZOLPIDEM TARTRATE 10 MG/1
10 TABLET ORAL
Qty: 30 | Refills: 2 | Status: COMPLETED | COMMUNITY
Start: 2020-10-22 | End: 2023-05-11

## 2023-05-11 RX ORDER — DILTIAZEM HYDROCHLORIDE 120 MG/1
120 TABLET ORAL
Refills: 0 | Status: ACTIVE | COMMUNITY

## 2023-05-11 RX ORDER — ZOLPIDEM TARTRATE 10 MG/1
10 TABLET ORAL
Qty: 30 | Refills: 2 | Status: COMPLETED | COMMUNITY
Start: 2023-02-27 | End: 2023-05-11

## 2023-05-18 NOTE — REVIEW OF SYSTEMS
[Loss of Hearing] : loss of hearing [Lower Ext Edema] : lower extremity edema [Insomnia] : insomnia [Negative] : Allergic/Immunologic [Fever] : no fever [Fatigue] : no fatigue [Recent Change In Weight] : ~T no recent weight change [Eye Pain] : no eye pain [Vision Problems] : no vision problems [Nosebleeds] : no nosebleeds [Hoarseness] : no hoarseness [Mucosal Pain] : no mucosal pain [Chest Pain] : no chest pain [Palpitations] : no palpitations [Shortness Of Breath] : no shortness of breath [Cough] : no cough [Abdominal Pain] : no abdominal pain [Constipation] : no constipation [Diarrhea] : no diarrhea [Dysuria] : no dysuria [Joint Stiffness] : no joint stiffness [Anxiety] : no anxiety [Depression] : no depression [Hot Flashes] : no hot flashes [FreeTextEntry2] : headaches [FreeTextEntry4] : occurring over several years [FreeTextEntry5] : periodically  [FreeTextEntry8] : prostate issue [de-identified] : progressive paraesthesia in legs hands feel and tingling in right side of face/ neuropathic pain-on Neurontin

## 2023-05-18 NOTE — HISTORY OF PRESENT ILLNESS
[Disease:__________________________] : Disease: [unfilled] [de-identified] : Patient is a 72 year old male referred by Dr. 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 Labs dated  01/12/2018 show Gamma globulin fraction: 2.2, M spike unable to quantitate but a weak gamma migrating paraprotein identified. Immunofixation identified IgM Kappa band.  Also noted ALVARO 1:160 IFA pattern: speckled.  Patient reports having tingling sensation in hands, feet, arms, legs, and right side face. Patient reports having fatigue.  \par He was diagnosed with CIPD with anti-MAG antibody couple of years ago and has progression. He complains of paresthesias in legs up to knee level and arms.  He feels his balance is affected.  He was treated with IVIG pulse treatment and there was improvement in the reflexes but not with paresthesias.  [de-identified] : Mr. Aguilar presents today for followup for IgM monoclonal gammopathy and neuropathic pain.  Patient still complains about pain in the legs, feet, and face (improved.).  Patient also complains of "coldness" and tingling in the hands. Patient received both vaccination for COVID. \par recent episode of Afib, follwed by SVT.

## 2023-05-18 NOTE — ASSESSMENT
[FreeTextEntry1] : 78 year old  active man referred by Dr. 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.\par \par October 2019 - MAG negative.\par \par - rituximab 3 rd round - 375mg/m2 x 4 May 2019\par - change to rituximab 1 gm q 2 weeks - Jan 2020, August 2020, April 2021\par - patient still symptomatic with neuropathy, but overall stable\par - IgM decreased, MAG ab negative, but with continued neuropathy, improvement in reflexes.\par - follow up with neurology since had flare up after last rituximab\par - if retreated then 1 gm x 1 q 2 weeks \par - on Viread - continue \par - he does more physical activity now, less fatigue\par - on Lyrica- decreased pain level - stopped taking \par - finished vaccination for COVID Feb 9, 2021, booster September 2021 \par - evening pain and discomfort in the legs, increase gabapentin to 800 mg at night, 400 mg in am\par - diagnosed with anaplasma - 8/2021, s/p doxycycline\par - COVID vaccine done\par - rituximab last dose 1/5/23- 1/20/23\par - neuropathy - prominent in legs \par - repeat MAG ab - patient inquiring about cytoxan. \par \par #  insomnia - reviewed with patient sleep hygiene \par \par # HTN  elevated BP - start Amlodipine 2.5 mg \par \par # DM Check HgA1c\par \par # Covid - July 2022, prolonged recovery\par \par # Sinusitis - ear pressure, congestion, seen by ENT.  Dr. Rose, to see Dr. White\par \par # BPH - s/o TURP \par

## 2023-05-18 NOTE — CONSULT LETTER
[Dear  ___] : Dear  [unfilled], [Consult Letter:] : I had the pleasure of evaluating your patient, [unfilled]. [Please see my note below.] : Please see my note below. [Consult Closing:] : Thank you very much for allowing me to participate in the care of this patient.  If you have any questions, please do not hesitate to contact me. [Sincerely,] : Sincerely, [FreeTextEntry3] : Angy Quijano MD\par [facility] [place] at [facility]\par

## 2023-05-18 NOTE — PHYSICAL EXAM
[Fully active, able to carry on all pre-disease performance without restriction] : Status 0 - Fully active, able to carry on all pre-disease performance without restriction [Normal] : affect appropriate [de-identified] : decreased sensation LE and hands

## 2023-06-29 ENCOUNTER — RESULT REVIEW (OUTPATIENT)
Age: 78
End: 2023-06-29

## 2023-06-29 ENCOUNTER — APPOINTMENT (OUTPATIENT)
Dept: HEMATOLOGY ONCOLOGY | Facility: CLINIC | Age: 78
End: 2023-06-29
Payer: MEDICARE

## 2023-06-29 VITALS
TEMPERATURE: 97 F | OXYGEN SATURATION: 97 % | DIASTOLIC BLOOD PRESSURE: 96 MMHG | WEIGHT: 203 LBS | HEIGHT: 70 IN | SYSTOLIC BLOOD PRESSURE: 161 MMHG | RESPIRATION RATE: 16 BRPM | BODY MASS INDEX: 29.06 KG/M2 | HEART RATE: 51 BPM

## 2023-06-29 VITALS — DIASTOLIC BLOOD PRESSURE: 93 MMHG | SYSTOLIC BLOOD PRESSURE: 161 MMHG

## 2023-06-29 PROCEDURE — 99213 OFFICE O/P EST LOW 20 MIN: CPT | Mod: 25

## 2023-06-29 PROCEDURE — 36415 COLL VENOUS BLD VENIPUNCTURE: CPT

## 2023-06-29 RX ORDER — MIRTAZAPINE 7.5 MG/1
7.5 TABLET, FILM COATED ORAL
Qty: 30 | Refills: 3 | Status: COMPLETED | COMMUNITY
Start: 2021-08-11 | End: 2023-06-29

## 2023-06-29 NOTE — REVIEW OF SYSTEMS
[Loss of Hearing] : loss of hearing [Lower Ext Edema] : lower extremity edema [Insomnia] : insomnia [Negative] : Allergic/Immunologic [Fever] : no fever [Fatigue] : no fatigue [Recent Change In Weight] : ~T no recent weight change [Eye Pain] : no eye pain [Vision Problems] : no vision problems [Nosebleeds] : no nosebleeds [Hoarseness] : no hoarseness [Mucosal Pain] : no mucosal pain [Chest Pain] : no chest pain [Palpitations] : no palpitations [Shortness Of Breath] : no shortness of breath [Cough] : no cough [Abdominal Pain] : no abdominal pain [Constipation] : no constipation [Dysuria] : no dysuria [Joint Stiffness] : no joint stiffness [Anxiety] : no anxiety [Depression] : no depression [Hot Flashes] : no hot flashes [FreeTextEntry2] : headaches [FreeTextEntry4] : occurring over several years [FreeTextEntry5] : periodically  [FreeTextEntry8] : prostate issue [de-identified] : progressive paraesthesia in legs hands feel and tingling in right side of face/ neuropathic pain-on Neurontin

## 2023-06-29 NOTE — CONSULT LETTER
[Dear  ___] : Dear  [unfilled], [Consult Letter:] : I had the pleasure of evaluating your patient, [unfilled]. [Please see my note below.] : Please see my note below. [Consult Closing:] : Thank you very much for allowing me to participate in the care of this patient.  If you have any questions, please do not hesitate to contact me. [Sincerely,] : Sincerely, [FreeTextEntry3] : Angy Quijano MD\par [facility] [place] at [facility]\par

## 2023-06-29 NOTE — ASSESSMENT
[FreeTextEntry1] : 78 year old  active man referred by Dr. 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.\par \par October 2019 - MAG negative.\par \par - rituximab 3 rd round - 375mg/m2 x 4 May 2019\par - change to rituximab 1 gm q 2 weeks - Jan 2020, August 2020, April 2021\par - patient still symptomatic with neuropathy, but overall stable\par - IgM decreased, MAG ab negative, but with continued neuropathy, improvement in reflexes.\par - follow up with neurology since had flare up after last rituximab\par - if retreated then 1 gm x 1 q 2 weeks \par - on Viread - continue \par - he does more physical activity now, less fatigue\par - on Lyrica- decreased pain level - stopped taking \par - finished vaccination for COVID Feb 9, 2021, booster September 2021 \par - evening pain and discomfort in the legs, increase gabapentin to 800 mg at night, 400 mg in am\par - diagnosed with anaplasma - 8/2021, s/p doxycycline\par - COVID vaccine done\par - rituximab last dose 1/5/23- 1/20/23, 6/2023\par - neuropathy - prominent in legs \par - repeat MAG ab - patient inquiring about cytoxan. Not necessary at this point - cont rituximab q 6 months\par \par #  insomnia - reviewed with patient sleep hygiene \par \par # HTN  elevated BP - start Amlodipine 2.5 mg - discontinued with coreg started, self monitors BP at home WNL\par \par # new onset afib/ SVT- late March, started on coreg/ eliquis.  Dr. Shoaib Blanco\par \par # Covid - July 2022, prolonged recovery\par \par # Sinusitis - ear pressure, congestion, seen by ENT.  Dr. Rose, to see Dr. White\par \par # BPH - s/o TURP \par \par Case and mgmt discussed with Dr. Quijano- reg, mag antibodies, MM panel, wm LABS\par

## 2023-06-29 NOTE — PHYSICAL EXAM
[Fully active, able to carry on all pre-disease performance without restriction] : Status 0 - Fully active, able to carry on all pre-disease performance without restriction [Normal] : affect appropriate [de-identified] : decreased sensation LE and hands

## 2023-06-29 NOTE — HISTORY OF PRESENT ILLNESS
[Disease:__________________________] : Disease: [unfilled] [de-identified] : Patient is a 72 year old male referred by Dr. 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 Labs dated  01/12/2018 show Gamma globulin fraction: 2.2, M spike unable to quantitate but a weak gamma migrating paraprotein identified. Immunofixation identified IgM Kappa band.  Also noted ALVARO 1:160 IFA pattern: speckled.  Patient reports having tingling sensation in hands, feet, arms, legs, and right side face. Patient reports having fatigue.  \par He was diagnosed with CIPD with anti-MAG antibody couple of years ago and has progression. He complains of paresthesias in legs up to knee level and arms.  He feels his balance is affected.  He was treated with IVIG pulse treatment and there was improvement in the reflexes but not with paresthesias.  [de-identified] : Mr. Aguilar presents today for followup for IgM monoclonal gammopathy and neuropathic pain.  Patient still complains about pain in the legs, feet, and face (improved.).  Patient also complains of "coldness" and tingling in the hands. Patient received both vaccination for COVID. \par recent episode of Afib, follwed by SVT.

## 2023-10-03 ENCOUNTER — APPOINTMENT (OUTPATIENT)
Dept: COLORECTAL SURGERY | Facility: CLINIC | Age: 78
End: 2023-10-03
Payer: MEDICARE

## 2023-10-03 VITALS
BODY MASS INDEX: 29.49 KG/M2 | WEIGHT: 206 LBS | SYSTOLIC BLOOD PRESSURE: 150 MMHG | DIASTOLIC BLOOD PRESSURE: 88 MMHG | HEART RATE: 86 BPM | HEIGHT: 70 IN

## 2023-10-03 PROCEDURE — 99204 OFFICE O/P NEW MOD 45 MIN: CPT | Mod: 25

## 2023-10-03 PROCEDURE — 46600 DIAGNOSTIC ANOSCOPY SPX: CPT

## 2023-10-31 ENCOUNTER — NON-APPOINTMENT (OUTPATIENT)
Age: 78
End: 2023-10-31

## 2023-11-07 ENCOUNTER — APPOINTMENT (OUTPATIENT)
Dept: COLORECTAL SURGERY | Facility: CLINIC | Age: 78
End: 2023-11-07
Payer: MEDICARE

## 2023-11-07 VITALS
SYSTOLIC BLOOD PRESSURE: 167 MMHG | HEART RATE: 64 BPM | HEIGHT: 70 IN | DIASTOLIC BLOOD PRESSURE: 69 MMHG | BODY MASS INDEX: 28.63 KG/M2 | WEIGHT: 200 LBS

## 2023-11-07 PROCEDURE — 99213 OFFICE O/P EST LOW 20 MIN: CPT

## 2023-11-07 RX ORDER — TOPIRAMATE 25 MG/1
25 TABLET, COATED ORAL
Refills: 0 | Status: ACTIVE | COMMUNITY

## 2023-11-13 NOTE — CONSULT LETTER
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     # High-grade serous ovarian carcinoma with peritoneal carcinomatosis  - Continue TPN  - Continue zyprexa 5 mg po at bedtime  - Morphine 2mg IV q3h PRN pain  - Consulted palliative care, appreciate eval and recs. Pt still awaiting family before deciding trajectory of her care.  - Check CA-125 tomorrow AM     # Intractable nausea and vomiting in the setting of above; w/u largely negative for non-oncologic cause  # Weight loss in the setting of above  - Continue protonix 40mg po qd  - Continue dronabinol 2.5mg po BID  - Resume 0.5 mg lorazapam q6h as pt did not find relief w/0.25 mg  - Compazine 10mg q6h PRN breakthrough nausea    # B/l lower lobe PNA  # Mild tachycardia  # Fever, infectious vs tumor  # SOB, suspect subjective as pt not desaturating on RA  - Continue to follow cultures  - Repeat CXR, consider CT PE if pt fails to improve  - Continue zosyn    # HTN  - continue to hold losartan 25mg po qd, normotensive     # H/o Left Renal vein thrombosis  - Continue eliquis 5mg po BID     # Pancytopenia secondary to Gemzar received on 10/31/2023  - CTM daily CBC  - Transfusion hemoglobin goal > 7    # Rectal bleeding  - Pt has ostomy, thus this is unusual  - Pt unsure if she has hx of hemhorroids  - CTM as vitals and hgb stable    F: None  E: Monitor and replete lytes prn  N: TPN  DVT PPx: SCDs, on apixiban for renal vein thrombosis  GI PPx: On pantoprazole  Code: Full LET, decisional, spouse Garret is SDM  Dispo: 8T    Discussed with attending Dr. Fofana. Please see  [FreeTextEntry3] : Angy Quijano MD\par [facility] [place] at [facility]\par  attending physician note or addendum for final assessment and plan.    Tamera Arreguin MD MS  PGY-1 Internal Medicine - Preliminary  11/13/2023  Perfect serve to contact  For overnight issues please page \"Oncology Intern - Internal Medicine [facility]\"    SUBJECTIVE     NAEO. Pt continues to endorse nausea, had emesis x1 and Tmax 38. Resolved w/o intervention. Also endorsed SOB, was saturating well but placed on 1L NC for comfort. Lastly also reports episodes of \"red stool\", pt unable to articulate if this is hematochezia vs melena.    She otherwise denies any new sx or concerns. Pain controlled. Requesting ativan to be resumed at prior dose as she doesn't find relief at half dosing.    OBJECTIVE     VITAL SIGNS:  Vital Last Value 24 Hour Range   Temperature 100.2 °F (37.9 °C) (11/13/23 0529) Temp  Min: 98.8 °F (37.1 °C)  Max: 100.2 °F (37.9 °C)   Pulse (!) 107 (11/13/23 0529) Pulse  Min: 96  Max: 107   Respiratory 18 (11/13/23 0529) Resp  Min: 18  Max: 18   Non-Invasive  Blood Pressure 121/85 (11/13/23 0529) BP  Min: 119/79  Max: 121/85   Pulse Oximetry 92 % (11/13/23 0529) SpO2  Min: 92 %  Max: 97 %       INTAKE/OUTPUT:  Date 11/12/23 0700 - 11/13/23 0659 11/13/23 0700 - 11/14/23 0659   Shift 4036-9615 9964-9267 0592-0127 24 Hour Total 0556-8020 8950-3497 9952-2718 24 Hour Total   INTAKE   P.O. 250 375  625       IV Piggyback  600  600       TPN  3468  3468       Shift Total 250 4443  4693       OUTPUT   Stool  650  650       Shift Total  650  650       Weight (kg) 49.8 49.8 49.8 49.8 49.8 49.8 49.8 49.8       PHYSICAL EXAM  Physical Exam  Vitals reviewed.   Constitutional:       General: She is not in acute distress.     Appearance: She is not toxic-appearing.   HENT:      Head: Normocephalic and atraumatic.      Neck: Normal range of motion.   Eyes:      Conjunctiva/sclera: Conjunctivae normal.   Cardiovascular:      Rate and Rhythm: Regular rhythm. Tachycardia present.   Pulmonary:      Effort: Pulmonary effort is  normal. No respiratory distress.      Breath sounds: Normal breath sounds. No wheezing.   Abdominal:      Palpations: Abdomen is soft.      Tenderness: There is no abdominal tenderness. There is no guarding or rebound.   Musculoskeletal:         General: Normal range of motion.   Skin:     General: Skin is warm and dry.   Neurological:      Mental Status: She is alert and oriented to person, place, and time.       LABS  Recent Labs   Lab 11/12/23  0452 11/11/23  0454 11/10/23  0458 11/09/23  0534 11/08/23  0431   SODIUM 135 136 136 135 133*   POTASSIUM 4.0 3.9 3.9 4.0 4.0   CHLORIDE 103 104 101 99 99   CO2 25 28 28 28 29   ANIONGAP 11 8 11 12 9   BUN 18 20 20 17 14   CREATININE 0.82 0.83 0.78 0.70 0.82   CALCIUM 8.7 8.5 8.2* 8.2* 8.5   GLUCOSE 136* 82 131* 144* 103*         Recent Labs   Lab 11/12/23  0452 11/11/23  0454 11/10/23  0458 11/09/23  0534 11/08/23  0431   HGB 8.1* 7.8* 8.1* 6.6* 7.0*   HCT 25.5* 24.0* 24.3* 20.7* 21.8*    106* 84* 67* 49*   WBC 5.9 6.4 5.5 4.1* 3.5*   MCV 90.7 89.9 89.0 91.6 90.1         Recent Labs   Lab 11/12/23  0452 11/11/23  0454 11/10/23  0458 11/09/23  0534 11/08/23  0431   MG 1.9 2.2 2.1 2.2 1.5*   PHOS 2.8 3.7 3.2 3.1 3.4         No results found      Recent Labs   Lab 11/12/23  0452 11/11/23  0454 11/10/23  0458 11/09/23  0534 11/08/23  0431   ALBUMIN 1.8* 1.8* 1.9* 1.9* 2.0*   AST 26 37 46* 35 34       UA  Recent Labs   Lab 11/10/23  1621   USPG 1.017   UGLU Negative   UKET Negative   URBC Negative   UPROT Trace*   UNITR Negative   UWBC Negative        Urine Studies  No results found    IMAGING  XR CHEST AP OR PA   Final Result   Bilateral lower lobe pneumonia. Small right pleural effusion.            Electronically Signed by: CELIA HANDY MD    Signed on: 11/10/2023 3:08 PM    Workstation ID: [id number]      CT ABDOMEN PELVIS W CONTRAST   Final Result   1.   No new acute abnormality is identified to explain patient's symptoms.   There is no evidence of bowel  obstruction.   2.   Overall similar findings related to the patient's diffuse metastatic   disease as described above. Loculated fluid collection in the left upper   quadrant is similar in size and appearance to the prior examination.   Slightly increased size of small bilateral pleural effusions.               Electronically Signed by: LUZ URENA MD    Signed on: 11/9/2023 4:37 PM    Workstation ID: [id number]

## 2024-01-04 ENCOUNTER — RESULT REVIEW (OUTPATIENT)
Age: 79
End: 2024-01-04

## 2024-01-04 ENCOUNTER — APPOINTMENT (OUTPATIENT)
Dept: HEMATOLOGY ONCOLOGY | Facility: CLINIC | Age: 79
End: 2024-01-04
Payer: MEDICARE

## 2024-01-04 VITALS
OXYGEN SATURATION: 97 % | TEMPERATURE: 95.9 F | SYSTOLIC BLOOD PRESSURE: 152 MMHG | DIASTOLIC BLOOD PRESSURE: 83 MMHG | HEIGHT: 70 IN | RESPIRATION RATE: 16 BRPM | HEART RATE: 67 BPM | BODY MASS INDEX: 27.94 KG/M2 | WEIGHT: 195.19 LBS

## 2024-01-04 DIAGNOSIS — R63.4 ABNORMAL WEIGHT LOSS: ICD-10-CM

## 2024-01-04 DIAGNOSIS — M25.50 PAIN IN UNSPECIFIED JOINT: ICD-10-CM

## 2024-01-04 PROCEDURE — 36415 COLL VENOUS BLD VENIPUNCTURE: CPT

## 2024-01-04 PROCEDURE — 99214 OFFICE O/P EST MOD 30 MIN: CPT | Mod: 25

## 2024-01-06 PROBLEM — M25.50 ARTHRALGIA: Status: ACTIVE | Noted: 2018-05-27

## 2024-01-06 NOTE — REVIEW OF SYSTEMS
[Loss of Hearing] : loss of hearing [Lower Ext Edema] : lower extremity edema [Insomnia] : insomnia [Negative] : Allergic/Immunologic [Fever] : no fever [Fatigue] : no fatigue [Recent Change In Weight] : ~T no recent weight change [Eye Pain] : no eye pain [Vision Problems] : no vision problems [Nosebleeds] : no nosebleeds [Hoarseness] : no hoarseness [Mucosal Pain] : no mucosal pain [Chest Pain] : no chest pain [Palpitations] : no palpitations [Shortness Of Breath] : no shortness of breath [Cough] : no cough [Abdominal Pain] : no abdominal pain [Constipation] : no constipation [Dysuria] : no dysuria [Joint Stiffness] : no joint stiffness [Anxiety] : no anxiety [Depression] : no depression [Hot Flashes] : no hot flashes [FreeTextEntry2] : headaches [FreeTextEntry4] : occurring over several years [FreeTextEntry5] : periodically  [FreeTextEntry8] : prostate issue [de-identified] : progressive paraesthesia in legs hands feel and tingling in right side of face/ neuropathic pain-on Neurontin

## 2024-01-06 NOTE — CONSULT LETTER
[Dear  ___] : Dear  [unfilled], [Consult Letter:] : I had the pleasure of evaluating your patient, [unfilled]. [Please see my note below.] : Please see my note below. [Consult Closing:] : Thank you very much for allowing me to participate in the care of this patient.  If you have any questions, please do not hesitate to contact me. [Sincerely,] : Sincerely, [FreeTextEntry3] : Angy Quijano MD\par  [facility] [place] at [facility]\par

## 2024-01-06 NOTE — HISTORY OF PRESENT ILLNESS
[Disease:__________________________] : Disease: [unfilled] [de-identified] : Patient is a 72 year old male referred by Dr. 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 Labs dated  01/12/2018 show Gamma globulin fraction: 2.2, M spike unable to quantitate but a weak gamma migrating paraprotein identified. Immunofixation identified IgM Kappa band.  Also noted ALVARO 1:160 IFA pattern: speckled.  Patient reports having tingling sensation in hands, feet, arms, legs, and right side face. Patient reports having fatigue.  \par  He was diagnosed with CIPD with anti-MAG antibody couple of years ago and has progression. He complains of paresthesias in legs up to knee level and arms.  He feels his balance is affected.  He was treated with IVIG pulse treatment and there was improvement in the reflexes but not with paresthesias.  [de-identified] : Mr. Aguilar presents today for followup for IgM monoclonal gammopathy and neuropathic pain.  Patient still complains about pain in the legs, feet, and face (improved.).  Patient also complains of "coldness" and tingling in the hands. Patient received both vaccination for COVID. \par  recent episode of Afib, follwed by SVT.

## 2024-01-06 NOTE — ASSESSMENT
[FreeTextEntry1] : 78 year old active man referred by Dr. 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 October 2019 - MAG negative. - rituximab 3 rd round - 375mg/m2 x 4 May 2019 - change to rituximab 1 gm q 2 weeks - Jan 2020, August 2020, April 2021 - patient still symptomatic with neuropathy, but overall stable - IgM decreased, MAG ab negative, but with continued neuropathy, improvement in reflexes. - follow up with neurology since had flare up after last rituximab - if retreated then 1 gm x 1 q 2 weeks - on Viread - continue - he does more physical activity now, less fatigue - on Lyrica- decreased pain level - stopped taking - finished vaccination for COVID Feb 9, 2021, booster September 2021 - evening pain and discomfort in the legs, increase gabapentin to 800 mg at night, 400 mg in am  - diagnosed with anaplasma - 8/2021, s/p doxycycline  - COVID infection 3 months ago - now feels sick with weight loss - CT scan ordered  - rituximab last dose 1/5/23- 1/20/23, 6/2023- hold now  # insomnia - reviewed with patient sleep hygiene  # HTN elevated BP - start Amlodipine 2.5 mg - discontinued with coreg started, self monitors BP at home WNL  # new onset afib/ SVT- late March, started on coreg/ eliquis. Dr. Shoaib Blanco  mag antibodies, MM panel, wm LABS . [Medication(s)] : Medication(s)

## 2024-01-06 NOTE — PHYSICAL EXAM
[Fully active, able to carry on all pre-disease performance without restriction] : Status 0 - Fully active, able to carry on all pre-disease performance without restriction [Normal] : affect appropriate [de-identified] : decreased sensation LE and hands

## 2024-01-09 ENCOUNTER — TRANSCRIPTION ENCOUNTER (OUTPATIENT)
Age: 79
End: 2024-01-09

## 2024-01-11 ENCOUNTER — TRANSCRIPTION ENCOUNTER (OUTPATIENT)
Age: 79
End: 2024-01-11

## 2024-01-18 ENCOUNTER — TRANSCRIPTION ENCOUNTER (OUTPATIENT)
Age: 79
End: 2024-01-18

## 2024-02-01 ENCOUNTER — APPOINTMENT (OUTPATIENT)
Dept: HEMATOLOGY ONCOLOGY | Facility: CLINIC | Age: 79
End: 2024-02-01
Payer: MEDICARE

## 2024-02-01 ENCOUNTER — RESULT REVIEW (OUTPATIENT)
Age: 79
End: 2024-02-01

## 2024-02-01 VITALS
HEART RATE: 52 BPM | WEIGHT: 197.06 LBS | TEMPERATURE: 97.4 F | HEIGHT: 70 IN | RESPIRATION RATE: 17 BRPM | BODY MASS INDEX: 28.21 KG/M2 | SYSTOLIC BLOOD PRESSURE: 151 MMHG | OXYGEN SATURATION: 98 % | DIASTOLIC BLOOD PRESSURE: 83 MMHG

## 2024-02-01 DIAGNOSIS — Z11.59 ENCOUNTER FOR SCREENING FOR OTHER VIRAL DISEASES: ICD-10-CM

## 2024-02-01 DIAGNOSIS — R91.8 OTHER NONSPECIFIC ABNORMAL FINDING OF LUNG FIELD: ICD-10-CM

## 2024-02-01 DIAGNOSIS — G62.9 POLYNEUROPATHY, UNSPECIFIED: ICD-10-CM

## 2024-02-01 DIAGNOSIS — C88.0 WALDENSTROM MACROGLOBULINEMIA: ICD-10-CM

## 2024-02-01 PROCEDURE — 36415 COLL VENOUS BLD VENIPUNCTURE: CPT

## 2024-02-01 PROCEDURE — 99214 OFFICE O/P EST MOD 30 MIN: CPT

## 2024-02-01 RX ORDER — GUANFACINE 2 MG/1
2 TABLET ORAL
Refills: 0 | Status: ACTIVE | COMMUNITY

## 2024-02-01 RX ORDER — ACETYLCYSTEINE 600 MG
CAPSULE ORAL
Refills: 0 | Status: ACTIVE | COMMUNITY

## 2024-02-01 RX ORDER — TENOFOVIR DISOPROXIL FUMARATE 300 MG/1
300 TABLET ORAL DAILY
Qty: 90 | Refills: 3 | Status: COMPLETED | COMMUNITY
Start: 2018-03-21 | End: 2024-02-01

## 2024-02-02 PROBLEM — G62.9 NEUROPATHY: Status: ACTIVE | Noted: 2018-03-09

## 2024-02-02 PROBLEM — C88.0 WALDENSTROM MACROGLOBULINEMIA: Status: ACTIVE | Noted: 2018-03-02

## 2024-02-02 PROBLEM — Z11.59 SCREENING FOR VIRAL DISEASE: Status: ACTIVE | Noted: 2020-07-07

## 2024-02-02 NOTE — HISTORY OF PRESENT ILLNESS
[de-identified] : Patient is a 72 year old male referred by Dr. 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 Labs dated  01/12/2018 show Gamma globulin fraction: 2.2, M spike unable to quantitate but a weak gamma migrating paraprotein identified. Immunofixation identified IgM Kappa band.  Also noted ALVARO 1:160 IFA pattern: speckled.  Patient reports having tingling sensation in hands, feet, arms, legs, and right side face. Patient reports having fatigue.  \par  He was diagnosed with CIPD with anti-MAG antibody couple of years ago and has progression. He complains of paresthesias in legs up to knee level and arms.  He feels his balance is affected.  He was treated with IVIG pulse treatment and there was improvement in the reflexes but not with paresthesias.  [de-identified] : Mr. Aguilar presents today for followup for IgM monoclonal gammopathy and neuropathic pain.  Patient overall feels well with no new issues. He still has persistent discomfort in the legs, feet, face ever since he had COVID.

## 2024-02-02 NOTE — CONSULT LETTER
[FreeTextEntry3] : Angy Quijano MD\par  [facility] [place] at [facility]\par

## 2024-02-02 NOTE — ASSESSMENT
[With Patient/Caregiver] : With Patient/Caregiver [Designated Health Care Proxy] : Designated Health Care Proxy [Name: ___] : Name: [unfilled] [Relationship: ___] : Relationship: [unfilled] [FreeTextEntry1] : 78 year old active man referred by Dr. 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 October 2019 - MAG negative. - rituximab 3 rd round - 375mg/m2 x 4 May 2019 - change to rituximab 1 gm q 2 weeks - Jan 2020, August 2020, April 2021 - patient still symptomatic with neuropathy, but overall stable - IgM decreased, MAG ab negative, but with continued neuropathy, improvement in reflexes. - follow up with neurology since had flare up after last rituximab - hold rituximab, prolonged Covid with pulmonary infiltrates, fatigue - IVIG received in the hospital, 2nd dose today - CT scan reviewed - pulmonary infiltrates, repeat in 6 weeks - rituximab last dose 1/5/23- 1/20/23, 6/2023- hold now  Hep B- stop Viread - off rituximab x 6 months   #Fatigue - increased with COVID and recent infection  # Pain - evening pain and discomfort in the legs, increase gabapentin to 800 mg at night, 400 mg in am  # diagnosed with anaplasma - 8/2021, s/p doxycycline  # insomnia - reviewed with patient sleep hygiene  # HTN elevated BP - start Amlodipine 2.5 mg - discontinued with coreg started, self monitors BP at home WNL  # new onset afib/ SVT- late March, started on coreg/ eliquis. Dr. Shoaib Blanco  mag antibodies, MM panel, wm LABS . [AdvancecareDate] : 02/01/2024 [FreeTextEntry2] : Defer additional wishes at this time.

## 2024-02-02 NOTE — REVIEW OF SYSTEMS
[Negative] : Neurological [Fever] : no fever [Fatigue] : no fatigue [Recent Change In Weight] : ~T no recent weight change [Eye Pain] : no eye pain [Vision Problems] : no vision problems [Nosebleeds] : no nosebleeds [Hoarseness] : no hoarseness [Mucosal Pain] : no mucosal pain [Chest Pain] : no chest pain [Palpitations] : no palpitations [Shortness Of Breath] : no shortness of breath [Cough] : no cough [Abdominal Pain] : no abdominal pain [Constipation] : no constipation [Dysuria] : no dysuria [Joint Stiffness] : no joint stiffness [Anxiety] : no anxiety [Depression] : no depression [Hot Flashes] : no hot flashes [FreeTextEntry4] : occurring over several years [FreeTextEntry5] : minimal  [FreeTextEntry7] : reported to be gassy

## 2024-02-20 ENCOUNTER — RESULT REVIEW (OUTPATIENT)
Age: 79
End: 2024-02-20

## 2024-06-20 ENCOUNTER — RESULT REVIEW (OUTPATIENT)
Age: 79
End: 2024-06-20

## 2024-06-20 ENCOUNTER — APPOINTMENT (OUTPATIENT)
Dept: HEMATOLOGY ONCOLOGY | Facility: CLINIC | Age: 79
End: 2024-06-20

## 2024-06-20 VITALS
HEIGHT: 70 IN | SYSTOLIC BLOOD PRESSURE: 125 MMHG | BODY MASS INDEX: 28.12 KG/M2 | TEMPERATURE: 97.8 F | OXYGEN SATURATION: 98 % | WEIGHT: 196.38 LBS | DIASTOLIC BLOOD PRESSURE: 72 MMHG | HEART RATE: 41 BPM | RESPIRATION RATE: 16 BRPM

## 2024-06-20 DIAGNOSIS — R79.89 OTHER SPECIFIED ABNORMAL FINDINGS OF BLOOD CHEMISTRY: ICD-10-CM

## 2024-06-20 DIAGNOSIS — G61.81 CHRONIC INFLAMMATORY DEMYELINATING POLYNEURITIS: ICD-10-CM

## 2024-06-20 DIAGNOSIS — G61.89 OTHER INFLAMMATORY POLYNEUROPATHIES: ICD-10-CM

## 2024-06-20 DIAGNOSIS — D47.2 MONOCLONAL GAMMOPATHY: ICD-10-CM

## 2024-06-20 PROCEDURE — 99214 OFFICE O/P EST MOD 30 MIN: CPT

## 2024-06-20 PROCEDURE — 36415 COLL VENOUS BLD VENIPUNCTURE: CPT

## 2024-06-20 NOTE — CONSULT LETTER
[Dear  ___] : Dear  [unfilled], [Consult Letter:] : I had the pleasure of evaluating your patient, [unfilled]. [Please see my note below.] : Please see my note below. [Consult Closing:] : Thank you very much for allowing me to participate in the care of this patient.  If you have any questions, please do not hesitate to contact me. [Sincerely,] : Sincerely, [FreeTextEntry3] : Angy Quijano MD\par  [facility] [place] at [facility]\par

## 2024-06-20 NOTE — REVIEW OF SYSTEMS
[Loss of Hearing] : loss of hearing [Lower Ext Edema] : lower extremity edema [Insomnia] : insomnia [Negative] : Allergic/Immunologic [Fever] : no fever [Fatigue] : no fatigue [Recent Change In Weight] : ~T no recent weight change [Eye Pain] : no eye pain [Vision Problems] : no vision problems [Nosebleeds] : no nosebleeds [Hoarseness] : no hoarseness [Mucosal Pain] : no mucosal pain [Chest Pain] : no chest pain [Palpitations] : no palpitations [Shortness Of Breath] : no shortness of breath [Cough] : no cough [Abdominal Pain] : no abdominal pain [Constipation] : no constipation [Dysuria] : no dysuria [Joint Stiffness] : no joint stiffness [Anxiety] : no anxiety [Depression] : no depression [Hot Flashes] : no hot flashes [FreeTextEntry4] : occurring over several years [FreeTextEntry5] : minimal  [FreeTextEntry7] : reported to be gassy

## 2024-06-20 NOTE — PHYSICAL EXAM
[Fully active, able to carry on all pre-disease performance without restriction] : Status 0 - Fully active, able to carry on all pre-disease performance without restriction [Normal] : normal appearance, no rash, nodules, vesicles, ulcers, erythema [de-identified] : decreased sensation LE and hands

## 2024-06-20 NOTE — ASSESSMENT
[With Patient/Caregiver] : With Patient/Caregiver [Medication(s)] : Medication(s) [Designated Health Care Proxy] : Designated Health Care Proxy [Relationship: ___] : Relationship: [unfilled] [Name: ___] : Name: [unfilled] [FreeTextEntry1] : 79-year-old active man referred by Dr. 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 October 2019 - MAG negative. - rituximab 3 rd round - 375mg/m2 x 4 May 2019 - change to rituximab 1 gm q 2 weeks - Jan 2020, August 2020, April 2021 - patient still symptomatic with neuropathy, but overall stable - IgM decreased, MAG ab negative, but with continued neuropathy, improvement in reflexes. - follow up with neurology since had flare up after last rituximab - hold rituximab, prolonged Covid with pulmonary infiltrates, fatigue - IVIG received in the hospital, 2nd dose today - CT scan reviewed - pulmonary infiltrates, repeat in 6 weeks - rituximab last dose 1/5/23- 1/20/23, 6/2023- hold now  Hep B- stop Viread - off rituximab x 6 months   #Fatigue - increased with COVID and recent infection  # Pain - evening pain and discomfort in the legs, increase gabapentin to 800 mg at night, 400 mg in am  # diagnosed with anaplasma - 8/2021, s/p doxycycline  # insomnia - reviewed with patient sleep hygiene  # HTN elevated BP - start Amlodipine 2.5 mg - discontinued with coreg started, self monitors BP at home WNL  # new onset afib/ SVT- late March, started on coreg/ eliquis. Dr. Shoaib Blanco  # [facility]AS- on BIPAP  mag antibodies, MM panel, wm LABS . [AdvancecareDate] : 02/01/2024 [FreeTextEntry2] : Defer additional wishes at this time.

## 2024-06-20 NOTE — HISTORY OF PRESENT ILLNESS
[Disease:__________________________] : Disease: [unfilled] [de-identified] : Patient is a 72 year old male referred by Dr. 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 Labs dated  01/12/2018 show Gamma globulin fraction: 2.2, M spike unable to quantitate but a weak gamma migrating paraprotein identified. Immunofixation identified IgM Kappa band.  Also noted ALVARO 1:160 IFA pattern: speckled.  Patient reports having tingling sensation in hands, feet, arms, legs, and right side face. Patient reports having fatigue.  \par  He was diagnosed with CIPD with anti-MAG antibody couple of years ago and has progression. He complains of paresthesias in legs up to knee level and arms.  He feels his balance is affected.  He was treated with IVIG pulse treatment and there was improvement in the reflexes but not with paresthesias.  [de-identified] : Mr. Aguilar presents today for followup for IgM monoclonal gammopathy and neuropathic pain.  Patient overall feels well with no new issues. Patient states that he has long COVID and was on paxlovid 3 weeks ago.

## 2024-10-17 ENCOUNTER — APPOINTMENT (OUTPATIENT)
Dept: HEMATOLOGY ONCOLOGY | Facility: CLINIC | Age: 79
End: 2024-10-17
Payer: MEDICARE

## 2024-10-17 ENCOUNTER — RESULT REVIEW (OUTPATIENT)
Age: 79
End: 2024-10-17

## 2024-10-17 VITALS
TEMPERATURE: 97.8 F | HEIGHT: 70 IN | BODY MASS INDEX: 28.49 KG/M2 | RESPIRATION RATE: 16 BRPM | SYSTOLIC BLOOD PRESSURE: 136 MMHG | HEART RATE: 53 BPM | DIASTOLIC BLOOD PRESSURE: 82 MMHG | OXYGEN SATURATION: 97 % | WEIGHT: 199 LBS

## 2024-10-17 DIAGNOSIS — G61.89 OTHER INFLAMMATORY POLYNEUROPATHIES: ICD-10-CM

## 2024-10-17 DIAGNOSIS — C88.00 WALDENSTROM MACROGLOBULINEMIA NOT HAVING ACHIEVED REMISSION: ICD-10-CM

## 2024-10-17 DIAGNOSIS — D47.2 MONOCLONAL GAMMOPATHY: ICD-10-CM

## 2024-10-17 DIAGNOSIS — R79.89 OTHER SPECIFIED ABNORMAL FINDINGS OF BLOOD CHEMISTRY: ICD-10-CM

## 2024-10-17 PROCEDURE — 99213 OFFICE O/P EST LOW 20 MIN: CPT

## 2024-10-17 PROCEDURE — 36415 COLL VENOUS BLD VENIPUNCTURE: CPT

## 2024-10-17 RX ORDER — NALTREXONE HYDROCHLORIDE 50 MG/1
TABLET, FILM COATED ORAL
Refills: 0 | Status: ACTIVE | COMMUNITY

## 2024-10-21 PROBLEM — C88.00 WALDENSTROM MACROGLOBULINEMIA: Status: ACTIVE | Noted: 2018-03-02

## 2024-12-19 ENCOUNTER — APPOINTMENT (OUTPATIENT)
Dept: GASTROENTEROLOGY | Facility: CLINIC | Age: 79
End: 2024-12-19
Payer: MEDICARE

## 2024-12-19 VITALS
SYSTOLIC BLOOD PRESSURE: 110 MMHG | DIASTOLIC BLOOD PRESSURE: 64 MMHG | HEART RATE: 57 BPM | BODY MASS INDEX: 28.2 KG/M2 | WEIGHT: 197 LBS | OXYGEN SATURATION: 98 % | HEIGHT: 70 IN

## 2024-12-19 DIAGNOSIS — R19.7 DIARRHEA, UNSPECIFIED: ICD-10-CM

## 2024-12-19 DIAGNOSIS — R14.0 ABDOMINAL DISTENSION (GASEOUS): ICD-10-CM

## 2024-12-19 PROCEDURE — 99203 OFFICE O/P NEW LOW 30 MIN: CPT

## 2024-12-20 LAB
ALBUMIN SERPL ELPH-MCNC: 4.4 G/DL
ALP BLD-CCNC: 73 U/L
ALT SERPL-CCNC: 17 U/L
ANION GAP SERPL CALC-SCNC: 12 MMOL/L
AST SERPL-CCNC: 23 U/L
BILIRUB SERPL-MCNC: 0.3 MG/DL
BUN SERPL-MCNC: 18 MG/DL
CALCIUM SERPL-MCNC: 9.6 MG/DL
CHLORIDE SERPL-SCNC: 106 MMOL/L
CO2 SERPL-SCNC: 22 MMOL/L
CREAT SERPL-MCNC: 1.06 MG/DL
EGFR: 71 ML/MIN/1.73M2
GLIADIN IGA SER QL: <0.2 U/ML
GLIADIN IGG SER QL: <0.4 U/ML
GLIADIN PEPTIDE IGA SER-ACNC: NEGATIVE
GLIADIN PEPTIDE IGG SER-ACNC: NEGATIVE
GLUCOSE SERPL-MCNC: 108 MG/DL
HCT VFR BLD CALC: 45.6 %
HGB BLD-MCNC: 14.7 G/DL
IGA SER QL IEP: 59 MG/DL
MCHC RBC-ENTMCNC: 29.9 PG
MCHC RBC-ENTMCNC: 32.2 G/DL
MCV RBC AUTO: 92.9 FL
PLATELET # BLD AUTO: 241 K/UL
POTASSIUM SERPL-SCNC: 4.5 MMOL/L
PROT SERPL-MCNC: 6.5 G/DL
RBC # BLD: 4.91 M/UL
RBC # FLD: 15 %
SODIUM SERPL-SCNC: 141 MMOL/L
TTG IGA SER IA-ACNC: <0.5 U/ML
TTG IGA SER-ACNC: NEGATIVE
TTG IGG SER IA-ACNC: <0.8 U/ML
TTG IGG SER IA-ACNC: NEGATIVE
WBC # FLD AUTO: 6.02 K/UL

## 2024-12-21 LAB
CDIFF BY PCR: NOT DETECTED
ENDOMYSIUM IGA SER QL: NEGATIVE
ENDOMYSIUM IGA TITR SER: NORMAL
G LAMBLIA+C PARVUM AG STL QL: NORMAL

## 2024-12-22 LAB — H PYLORI AG STL QL: NEGATIVE

## 2024-12-23 LAB
BACTERIA STL CULT: NORMAL
DEPRECATED O AND P PREP STL: NORMAL

## 2024-12-25 LAB — CALPROTECTIN FECAL: 897 UG/G

## 2024-12-30 DIAGNOSIS — R19.5 OTHER FECAL ABNORMALITIES: ICD-10-CM

## 2024-12-30 DIAGNOSIS — R19.4 CHANGE IN BOWEL HABIT: ICD-10-CM

## 2024-12-30 RX ORDER — SODIUM SULFATE, POTASSIUM SULFATE AND MAGNESIUM SULFATE 1.6; 3.13; 17.5 G/177ML; G/177ML; G/177ML
17.5-3.13-1.6 SOLUTION ORAL
Qty: 2 | Refills: 0 | Status: ACTIVE | COMMUNITY
Start: 2024-12-30 | End: 1900-01-01

## 2025-01-13 ENCOUNTER — RESULT REVIEW (OUTPATIENT)
Age: 80
End: 2025-01-13

## 2025-01-13 ENCOUNTER — APPOINTMENT (OUTPATIENT)
Dept: GASTROENTEROLOGY | Facility: HOSPITAL | Age: 80
End: 2025-01-13

## 2025-05-01 ENCOUNTER — RESULT REVIEW (OUTPATIENT)
Age: 80
End: 2025-05-01

## 2025-05-01 ENCOUNTER — APPOINTMENT (OUTPATIENT)
Dept: HEMATOLOGY ONCOLOGY | Facility: CLINIC | Age: 80
End: 2025-05-01
Payer: MEDICARE

## 2025-05-01 VITALS
OXYGEN SATURATION: 98 % | BODY MASS INDEX: 29.49 KG/M2 | SYSTOLIC BLOOD PRESSURE: 143 MMHG | WEIGHT: 206 LBS | HEIGHT: 70 IN | RESPIRATION RATE: 16 BRPM | TEMPERATURE: 97.2 F | HEART RATE: 61 BPM | DIASTOLIC BLOOD PRESSURE: 73 MMHG

## 2025-05-01 DIAGNOSIS — D47.2 MONOCLONAL GAMMOPATHY: ICD-10-CM

## 2025-05-01 DIAGNOSIS — G61.89 OTHER INFLAMMATORY POLYNEUROPATHIES: ICD-10-CM

## 2025-05-01 DIAGNOSIS — G61.81 CHRONIC INFLAMMATORY DEMYELINATING POLYNEURITIS: ICD-10-CM

## 2025-05-01 PROCEDURE — 36415 COLL VENOUS BLD VENIPUNCTURE: CPT

## 2025-05-01 PROCEDURE — 99213 OFFICE O/P EST LOW 20 MIN: CPT
